# Patient Record
Sex: FEMALE | Race: NATIVE HAWAIIAN OR OTHER PACIFIC ISLANDER | NOT HISPANIC OR LATINO | Employment: FULL TIME | URBAN - METROPOLITAN AREA
[De-identification: names, ages, dates, MRNs, and addresses within clinical notes are randomized per-mention and may not be internally consistent; named-entity substitution may affect disease eponyms.]

---

## 2017-12-19 ENCOUNTER — NON-PROVIDER VISIT (OUTPATIENT)
Dept: OCCUPATIONAL MEDICINE | Facility: CLINIC | Age: 32
End: 2017-12-19

## 2017-12-19 DIAGNOSIS — Z11.1 ENCOUNTER FOR PPD TEST: ICD-10-CM

## 2017-12-19 PROCEDURE — 86580 TB INTRADERMAL TEST: CPT | Performed by: PREVENTIVE MEDICINE

## 2017-12-21 ENCOUNTER — NON-PROVIDER VISIT (OUTPATIENT)
Dept: OCCUPATIONAL MEDICINE | Facility: CLINIC | Age: 32
End: 2017-12-21

## 2017-12-21 DIAGNOSIS — Z11.1 ENCOUNTER FOR PPD TEST: ICD-10-CM

## 2017-12-21 LAB — TB WHEAL 3D P 5 TU DIAM: NORMAL MM

## 2018-03-29 ENCOUNTER — HOSPITAL ENCOUNTER (OUTPATIENT)
Facility: MEDICAL CENTER | Age: 33
End: 2018-03-29
Attending: PHYSICIAN ASSISTANT
Payer: COMMERCIAL

## 2018-03-29 ENCOUNTER — OFFICE VISIT (OUTPATIENT)
Dept: URGENT CARE | Facility: PHYSICIAN GROUP | Age: 33
End: 2018-03-29
Payer: COMMERCIAL

## 2018-03-29 VITALS
DIASTOLIC BLOOD PRESSURE: 70 MMHG | SYSTOLIC BLOOD PRESSURE: 110 MMHG | OXYGEN SATURATION: 93 % | TEMPERATURE: 98.5 F | BODY MASS INDEX: 38.82 KG/M2 | HEIGHT: 65 IN | HEART RATE: 103 BPM | WEIGHT: 233 LBS

## 2018-03-29 DIAGNOSIS — N30.01 ACUTE CYSTITIS WITH HEMATURIA: Primary | ICD-10-CM

## 2018-03-29 DIAGNOSIS — R30.0 DYSURIA: ICD-10-CM

## 2018-03-29 LAB
APPEARANCE UR: CLEAR
BILIRUB UR STRIP-MCNC: NORMAL MG/DL
COLOR UR AUTO: NORMAL
GLUCOSE UR STRIP.AUTO-MCNC: NORMAL MG/DL
INT CON NEG: NEGATIVE
INT CON POS: POSITIVE
KETONES UR STRIP.AUTO-MCNC: NORMAL MG/DL
LEUKOCYTE ESTERASE UR QL STRIP.AUTO: NORMAL
NITRITE UR QL STRIP.AUTO: NORMAL
PH UR STRIP.AUTO: 6 [PH] (ref 5–8)
POC URINE PREGNANCY TEST: NORMAL
PROT UR QL STRIP: NORMAL MG/DL
RBC UR QL AUTO: NORMAL
SP GR UR STRIP.AUTO: 1.01
UROBILINOGEN UR STRIP-MCNC: NORMAL MG/DL

## 2018-03-29 PROCEDURE — 81002 URINALYSIS NONAUTO W/O SCOPE: CPT | Performed by: PHYSICIAN ASSISTANT

## 2018-03-29 PROCEDURE — 81025 URINE PREGNANCY TEST: CPT | Performed by: PHYSICIAN ASSISTANT

## 2018-03-29 PROCEDURE — 87086 URINE CULTURE/COLONY COUNT: CPT

## 2018-03-29 PROCEDURE — 99214 OFFICE O/P EST MOD 30 MIN: CPT | Performed by: PHYSICIAN ASSISTANT

## 2018-03-29 RX ORDER — CEFDINIR 300 MG/1
300 CAPSULE ORAL EVERY 12 HOURS
Qty: 10 CAP | Refills: 0 | Status: SHIPPED | OUTPATIENT
Start: 2018-03-29 | End: 2018-04-03

## 2018-03-30 DIAGNOSIS — R30.0 DYSURIA: ICD-10-CM

## 2018-03-30 DIAGNOSIS — N30.01 ACUTE CYSTITIS WITH HEMATURIA: ICD-10-CM

## 2018-03-31 ASSESSMENT — ENCOUNTER SYMPTOMS
CHILLS: 0
FEVER: 0

## 2018-04-01 NOTE — PROGRESS NOTES
"Subjective:      Fatemeh De La O is a 33 y.o. female who presents with UTI (urgency, frequently, painful urination, x2 days)    PMH:  has a past medical history of Allergy; Asthma; and Chronic bronchitis (CMS-[facility]).  MEDS:   Current Outpatient Prescriptions:   •  cefdinir (OMNICEF) 300 MG Cap, Take 1 Cap by mouth every 12 hours for 5 days., Disp: 10 Cap, Rfl: 0  •  clomiPHENE (CLOMID) 50 MG tablet, Take 50 mg by mouth every day., Disp: , Rfl:   •  progesterone (PROMETRIUM) 100 MG Cap, Take 100 mg by mouth every day., Disp: , Rfl:   •  doxycycline (VIBRAMYCIN) 100 MG Tab, Take 1 Tab by mouth 2 times a day., Disp: 20 Tab, Rfl: 0  ALLERGIES: No Known Allergies  SURGHX:   Past Surgical History:   Procedure Laterality Date   • DENTAL EXTRACTION(S)       SOCHX:  reports that she has been smoking Cigarettes.  She has a 3.00 pack-year smoking history. She has never used smokeless tobacco. She reports that she drinks about 1.2 - 1.8 oz of alcohol per week . She reports that she does not use drugs.  FH: Reviewed with patient/family. Not pertinent to this complaint.            UTI   This is a new problem. The current episode started yesterday. The problem occurs constantly. The problem has been gradually worsening. Associated symptoms include urinary symptoms. Pertinent negatives include no chills or fever.       Review of Systems   Constitutional: Negative for chills and fever.   Genitourinary: Positive for dysuria and frequency.   All other systems reviewed and are negative.         Objective:     /70   Pulse (!) 103   Temp 36.9 °C (98.5 °F)   Ht 1.651 m (5' 5\")   Wt 105.7 kg (233 lb)   SpO2 93%   BMI 38.77 kg/m²      Physical Exam   Constitutional: She is oriented to person, place, and time. She appears well-developed and well-nourished. No distress.   HENT:   Head: Normocephalic and atraumatic.   Nose: Nose normal.   Mouth/Throat: Oropharynx is clear and moist.   Eyes: Conjunctivae and EOM are normal. Pupils " are equal, round, and reactive to light.   Neck: Normal range of motion. Neck supple.   Cardiovascular: Normal rate, regular rhythm and normal heart sounds.    Pulmonary/Chest: Effort normal and breath sounds normal.   Abdominal: Soft. Bowel sounds are normal. There is no rebound and no guarding.   Musculoskeletal: Normal range of motion.   Neurological: She is alert and oriented to person, place, and time. Gait normal.   Skin: Skin is warm and dry. Capillary refill takes less than 2 seconds.   Psychiatric: She has a normal mood and affect.   Nursing note and vitals reviewed.          UA results interpreted by me:  +hematuria, +LE  Preg: neg  Assessment/Plan:     1. Acute cystitis with hematuria  POCT Urinalysis    POCT PREGNANCY    Urine Culture    cefdinir (OMNICEF) 300 MG Cap   2. Dysuria  POCT Urinalysis    POCT PREGNANCY    Urine Culture    cefdinir (OMNICEF) 300 MG Cap     PT can continue OTC medications, increase fluids and rest until symptoms improve.     PT should follow up with PCP in 1-2 days for re-evaluation if symptoms have not improved.  Discussed red flags and reasons to return to UC or ED.  Pt and/or family verbalized understanding of diagnosis and follow up instructions and was offered informational handout on diagnosis.  PT discharged.

## 2018-04-02 LAB
BACTERIA UR CULT: NORMAL
SIGNIFICANT IND 70042: NORMAL
SITE SITE: NORMAL
SOURCE SOURCE: NORMAL

## 2018-05-10 ENCOUNTER — OFFICE VISIT (OUTPATIENT)
Dept: URGENT CARE | Facility: PHYSICIAN GROUP | Age: 33
End: 2018-05-10
Payer: COMMERCIAL

## 2018-05-10 VITALS
TEMPERATURE: 97.9 F | WEIGHT: 234.4 LBS | HEART RATE: 86 BPM | OXYGEN SATURATION: 95 % | DIASTOLIC BLOOD PRESSURE: 86 MMHG | BODY MASS INDEX: 39.05 KG/M2 | HEIGHT: 65 IN | SYSTOLIC BLOOD PRESSURE: 130 MMHG

## 2018-05-10 DIAGNOSIS — J30.1 SEASONAL ALLERGIC RHINITIS DUE TO POLLEN: ICD-10-CM

## 2018-05-10 DIAGNOSIS — F17.200 TOBACCO DEPENDENCE: ICD-10-CM

## 2018-05-10 DIAGNOSIS — J45.20 MILD INTERMITTENT ASTHMA WITHOUT COMPLICATION: ICD-10-CM

## 2018-05-10 DIAGNOSIS — H69.92 DYSFUNCTION OF LEFT EUSTACHIAN TUBE: ICD-10-CM

## 2018-05-10 DIAGNOSIS — J02.0 STREP PHARYNGITIS: ICD-10-CM

## 2018-05-10 PROCEDURE — 99215 OFFICE O/P EST HI 40 MIN: CPT | Performed by: FAMILY MEDICINE

## 2018-05-10 RX ORDER — AMOXICILLIN AND CLAVULANATE POTASSIUM 875; 125 MG/1; MG/1
1 TABLET, FILM COATED ORAL 2 TIMES DAILY
Qty: 20 TAB | Refills: 0 | Status: SHIPPED | OUTPATIENT
Start: 2018-05-10 | End: 2018-12-16

## 2018-05-10 ASSESSMENT — ENCOUNTER SYMPTOMS
EYE REDNESS: 0
WHEEZING: 0
MYALGIAS: 0
WEAKNESS: 0
VOMITING: 0
EYE PAIN: 0
WEIGHT LOSS: 0
DIZZINESS: 0
FEVER: 1
BRUISES/BLEEDS EASILY: 0
DIAPHORESIS: 0
NAUSEA: 0
COUGH: 1
SHORTNESS OF BREATH: 0
SORE THROAT: 1
INSOMNIA: 0
EYE DISCHARGE: 0
HEADACHES: 1
CHILLS: 1

## 2018-05-10 NOTE — PROGRESS NOTES
Subjective:      Fatemeh De La O is a 33 y.o. female who presents with Sore Throat (hard to Swallow, cough, runny nose, x5 days)    Of the patient presents to urgent care at 5 days of sore throat fevers chills mostly dry cough, runny nose and congestion as well. She has a history of seasonal allergies have been acting up suddenly also with asthma. She is a smoker who is trying greatly to quit smoking currently. No nausea vomiting or diarrhea. Her son has similar symptoms of sore throat her started the day after his. She took ibuprofen about an hour before arrival to urgent care    HPI  Review of Systems   Constitutional: Positive for chills, fever and malaise/fatigue. Negative for diaphoresis and weight loss.   HENT: Positive for sore throat. Negative for ear pain.    Eyes: Negative for pain, discharge and redness.   Respiratory: Positive for cough. Negative for shortness of breath and wheezing.    Cardiovascular: Negative for chest pain.   Gastrointestinal: Negative for nausea and vomiting.   Genitourinary: Negative for dysuria and urgency.   Musculoskeletal: Negative for myalgias.   Skin: Negative for itching and rash.   Neurological: Positive for headaches. Negative for dizziness and weakness.   Endo/Heme/Allergies: Does not bruise/bleed easily.   Psychiatric/Behavioral: The patient does not have insomnia.    All other systems reviewed and are negative.    PMH:  has a past medical history of Allergy; Asthma; and Chronic bronchitis (HCC).  MEDS:   Current Outpatient Prescriptions:   •  Pseudoephedrine-Ibuprofen (ADVIL COLD/SINUS PO), Take  by mouth., Disp: , Rfl:   •  Dextromethorphan Polistirex (ROBITUSSIN 12 HOUR COUGH PO), Take  by mouth., Disp: , Rfl:   •  amoxicillin-clavulanate (AUGMENTIN) 875-125 MG Tab, Take 1 Tab by mouth 2 times a day. With food, Disp: 20 Tab, Rfl: 0  •  clomiPHENE (CLOMID) 50 MG tablet, Take 50 mg by mouth every day., Disp: , Rfl:   •  progesterone (PROMETRIUM) 100 MG Cap, Take 100 mg  "by mouth every day., Disp: , Rfl:   •  doxycycline (VIBRAMYCIN) 100 MG Tab, Take 1 Tab by mouth 2 times a day., Disp: 20 Tab, Rfl: 0  ALLERGIES: No Known Allergies  SURGHX:   Past Surgical History:   Procedure Laterality Date   • DENTAL EXTRACTION(S)       SOCHX:  reports that she has been smoking Cigarettes.  She has a 3.00 pack-year smoking history. She has never used smokeless tobacco. She reports that she drinks about 1.2 - 1.8 oz of alcohol per week . She reports that she does not use drugs.  FH: Family history was reviewed, no pertinent findings to report   Objective:     /86   Pulse 86   Temp 36.6 °C (97.9 °F)   Ht 1.651 m (5' 5\")   Wt 106.3 kg (234 lb 6.4 oz)   SpO2 95%   BMI 39.01 kg/m²      Physical Exam   Constitutional: She is oriented to person, place, and time. She appears well-developed and well-nourished. No distress.   HENT:   Left Ear: Tympanic membrane is injected and erythematous.   Mouth/Throat: Mucous membranes are normal. Posterior oropharyngeal edema and posterior oropharyngeal erythema present. No oropharyngeal exudate.   Eyes: Conjunctivae and EOM are normal. Pupils are equal, round, and reactive to light.   Neck: Normal range of motion.   Cardiovascular: Normal rate, regular rhythm, normal heart sounds and intact distal pulses.  Exam reveals no gallop and no friction rub.    No murmur heard.  Pulmonary/Chest: Effort normal and breath sounds normal. No respiratory distress. She has no wheezes. She exhibits no tenderness.   Abdominal: Soft.   Musculoskeletal: Normal range of motion. She exhibits no edema.   Lymphadenopathy:     She has cervical adenopathy.   Neurological: She is alert and oriented to person, place, and time.   Skin: Skin is warm and dry. She is not diaphoretic.   Psychiatric: She has a normal mood and affect. Her behavior is normal. Judgment and thought content normal.            Assessment/Plan:     1. Strep pharyngitis  Pseudoephedrine-Ibuprofen (ADVIL " COLD/SINUS PO)    Dextromethorphan Polistirex (ROBITUSSIN 12 HOUR COUGH PO)    amoxicillin-clavulanate (AUGMENTIN) 875-125 MG Tab   2. Dysfunction of left eustachian tube  Pseudoephedrine-Ibuprofen (ADVIL COLD/SINUS PO)    Dextromethorphan Polistirex (ROBITUSSIN 12 HOUR COUGH PO)    amoxicillin-clavulanate (AUGMENTIN) 875-125 MG Tab   3. Seasonal allergic rhinitis due to pollen  Pseudoephedrine-Ibuprofen (ADVIL COLD/SINUS PO)    Dextromethorphan Polistirex (ROBITUSSIN 12 HOUR COUGH PO)    amoxicillin-clavulanate (AUGMENTIN) 875-125 MG Tab   4. Tobacco dependence  Pseudoephedrine-Ibuprofen (ADVIL COLD/SINUS PO)    Dextromethorphan Polistirex (ROBITUSSIN 12 HOUR COUGH PO)    amoxicillin-clavulanate (AUGMENTIN) 875-125 MG Tab   5. Mild intermittent asthma without complication       Differential diagnosis, natural history, supportive care discussed. Follow-up with primary care provider within 7-10 days, emergency room precautions discussed.  Patient and/or family appears understanding of information.    Chronic condition of asthma  that may potentially impact the patient's ability to recover from this infection appear stable. The patient will f/u with their pcp and continue with current chronic medications as directed.

## 2018-12-16 ENCOUNTER — OFFICE VISIT (OUTPATIENT)
Dept: URGENT CARE | Facility: PHYSICIAN GROUP | Age: 33
End: 2018-12-16
Payer: COMMERCIAL

## 2018-12-16 VITALS
BODY MASS INDEX: 39.32 KG/M2 | OXYGEN SATURATION: 98 % | DIASTOLIC BLOOD PRESSURE: 74 MMHG | TEMPERATURE: 97.6 F | RESPIRATION RATE: 16 BRPM | HEIGHT: 65 IN | WEIGHT: 236 LBS | HEART RATE: 102 BPM | SYSTOLIC BLOOD PRESSURE: 112 MMHG

## 2018-12-16 DIAGNOSIS — R05.9 COUGH: ICD-10-CM

## 2018-12-16 DIAGNOSIS — R09.82 POST-NASAL DRIP: ICD-10-CM

## 2018-12-16 DIAGNOSIS — Z86.19 HISTORY OF CANDIDIASIS: ICD-10-CM

## 2018-12-16 DIAGNOSIS — J01.10 ACUTE FRONTAL SINUSITIS, RECURRENCE NOT SPECIFIED: Primary | ICD-10-CM

## 2018-12-16 PROCEDURE — 99214 OFFICE O/P EST MOD 30 MIN: CPT | Performed by: PHYSICIAN ASSISTANT

## 2018-12-16 RX ORDER — FLUCONAZOLE 150 MG/1
150 TABLET ORAL DAILY
Qty: 2 TAB | Refills: 0 | Status: SHIPPED | OUTPATIENT
Start: 2018-12-16 | End: 2019-01-28

## 2018-12-16 RX ORDER — AMOXICILLIN AND CLAVULANATE POTASSIUM 875; 125 MG/1; MG/1
1 TABLET, FILM COATED ORAL 2 TIMES DAILY
Qty: 20 TAB | Refills: 0 | Status: SHIPPED | OUTPATIENT
Start: 2018-12-16 | End: 2019-01-28

## 2018-12-16 ASSESSMENT — ENCOUNTER SYMPTOMS
FEVER: 1
SHORTNESS OF BREATH: 0
WHEEZING: 0
HEADACHES: 1
SINUS PAIN: 1
RHINORRHEA: 1
COUGH: 1
SORE THROAT: 0

## 2018-12-16 NOTE — PROGRESS NOTES
Subjective:      Fatemeh De LaO is a 33 y.o. female who presents with Cough (Congestion, headache and fever x 1 week)    PMH:  has a past medical history of Allergy; Asthma; and Chronic bronchitis (HCC).  MEDS:   Current Outpatient Prescriptions:   •  Pseudoephedrine-Ibuprofen (ADVIL COLD/SINUS PO), Take  by mouth., Disp: , Rfl:   •  Dextromethorphan Polistirex (ROBITUSSIN 12 HOUR COUGH PO), Take  by mouth., Disp: , Rfl:   •  amoxicillin-clavulanate (AUGMENTIN) 875-125 MG Tab, Take 1 Tab by mouth 2 times a day. With food (Patient not taking: Reported on 12/16/2018), Disp: 20 Tab, Rfl: 0  •  clomiPHENE (CLOMID) 50 MG tablet, Take 50 mg by mouth every day., Disp: , Rfl:   •  progesterone (PROMETRIUM) 100 MG Cap, Take 100 mg by mouth every day., Disp: , Rfl:   •  doxycycline (VIBRAMYCIN) 100 MG Tab, Take 1 Tab by mouth 2 times a day. (Patient not taking: Reported on 12/16/2018), Disp: 20 Tab, Rfl: 0  ALLERGIES: No Known Allergies  SURGHX:   Past Surgical History:   Procedure Laterality Date   • DENTAL EXTRACTION(S)       SOCHX:  reports that she has been smoking Cigarettes.  She has a 3.00 pack-year smoking history. She has never used smokeless tobacco. She reports that she drinks about 1.2 - 1.8 oz of alcohol per week . She reports that she does not use drugs.  FH: Reviewed with patient, not pertinent to this visit.           Patient presents with:  Cough: Congestion, headache and fever x 1 week.  Patient states symptoms started as a typical cold runny nose scratchy throat little postnasal drip without a fever.  Patient states things are starting to improve day before yesterday, then she developed a fever forehead pain headache significant purulent colored discharge when she blows her nose.  Patient has taken multiple over-the-counter medications with some improvement but no resolution          Cough   This is a new problem. The current episode started in the past 7 days. The problem has been gradually worsening.  "The problem occurs every few minutes. The cough is productive of sputum. Associated symptoms include a fever, headaches, nasal congestion, postnasal drip and rhinorrhea. Pertinent negatives include no sore throat, shortness of breath or wheezing. The symptoms are aggravated by lying down. Risk factors for lung disease include occupational exposure. She has tried body position changes and OTC cough suppressant for the symptoms. The treatment provided mild relief. Her past medical history is significant for asthma, bronchitis and environmental allergies. There is no history of pneumonia.       Review of Systems   Constitutional: Positive for fever.   HENT: Positive for congestion, postnasal drip, rhinorrhea and sinus pain. Negative for sore throat.    Respiratory: Positive for cough. Negative for shortness of breath and wheezing.    Neurological: Positive for headaches.   Endo/Heme/Allergies: Positive for environmental allergies.   All other systems reviewed and are negative.         Objective:     /74 (BP Location: Left arm, Patient Position: Sitting, BP Cuff Size: Adult)   Pulse (!) 102   Temp 36.4 °C (97.6 °F) (Temporal)   Resp 16   Ht 1.651 m (5' 5\")   Wt 107 kg (236 lb)   LMP 10/21/2018 (Approximate)   SpO2 98%   BMI 39.27 kg/m²      Physical Exam   Constitutional: She is oriented to person, place, and time. She appears well-developed and well-nourished. No distress.   HENT:   Head: Normocephalic and atraumatic.   Right Ear: Tympanic membrane normal.   Left Ear: Tympanic membrane normal.   Nose: Mucosal edema and rhinorrhea present. Right sinus exhibits frontal sinus tenderness. Left sinus exhibits frontal sinus tenderness.   Mouth/Throat: Uvula is midline and mucous membranes are normal. Posterior oropharyngeal erythema present. No oropharyngeal exudate or posterior oropharyngeal edema.   Post nasal drip in posterior oropharynx   Eyes: Pupils are equal, round, and reactive to light. Conjunctivae " and EOM are normal.   Neck: Normal range of motion. Neck supple.   Cardiovascular: Normal rate and regular rhythm.    Pulmonary/Chest: Effort normal and breath sounds normal. No respiratory distress.   Abdominal: Soft.   Musculoskeletal: Normal range of motion.   Lymphadenopathy:     She has no cervical adenopathy.   Neurological: She is alert and oriented to person, place, and time.   Skin: Skin is warm and dry. Capillary refill takes less than 2 seconds.   Psychiatric: She has a normal mood and affect.   Nursing note and vitals reviewed.         Assessment/Plan:      1. Acute frontal sinusitis, recurrence not specified  amoxicillin-clavulanate (AUGMENTIN) 875-125 MG Tab    fluconazole (DIFLUCAN) 150 MG tablet   2. Post-nasal drip  amoxicillin-clavulanate (AUGMENTIN) 875-125 MG Tab    fluconazole (DIFLUCAN) 150 MG tablet   3. Cough  amoxicillin-clavulanate (AUGMENTIN) 875-125 MG Tab    fluconazole (DIFLUCAN) 150 MG tablet   4. History of candidiasis  amoxicillin-clavulanate (AUGMENTIN) 875-125 MG Tab    fluconazole (DIFLUCAN) 150 MG tablet     PT can continue OTC medications, increase fluids and rest until symptoms improve.     PT should follow up with PCP in 1-2 days for re-evaluation if symptoms have not improved.  Discussed red flags and reasons to return to UC or ED.  Pt and/or family verbalized understanding of diagnosis and follow up instructions and was offered informational handout on diagnosis.  PT discharged.

## 2019-01-23 ENCOUNTER — APPOINTMENT (OUTPATIENT)
Dept: RADIOLOGY | Facility: IMAGING CENTER | Age: 34
End: 2019-01-23
Attending: PHYSICIAN ASSISTANT
Payer: COMMERCIAL

## 2019-01-23 ENCOUNTER — OFFICE VISIT (OUTPATIENT)
Dept: URGENT CARE | Facility: PHYSICIAN GROUP | Age: 34
End: 2019-01-23
Payer: COMMERCIAL

## 2019-01-23 VITALS
SYSTOLIC BLOOD PRESSURE: 118 MMHG | WEIGHT: 234 LBS | OXYGEN SATURATION: 98 % | BODY MASS INDEX: 38.99 KG/M2 | RESPIRATION RATE: 16 BRPM | DIASTOLIC BLOOD PRESSURE: 70 MMHG | HEART RATE: 150 BPM | HEIGHT: 65 IN | TEMPERATURE: 101.2 F

## 2019-01-23 DIAGNOSIS — R05.9 COUGH: ICD-10-CM

## 2019-01-23 DIAGNOSIS — R50.9 FEVER, UNSPECIFIED FEVER CAUSE: ICD-10-CM

## 2019-01-23 LAB
FLUAV+FLUBV AG SPEC QL IA: NORMAL
HETEROPH AB SER QL LA: NORMAL
INT CON NEG: NEGATIVE
INT CON POS: POSITIVE
S PYO AG THROAT QL: NORMAL

## 2019-01-23 PROCEDURE — 87804 INFLUENZA ASSAY W/OPTIC: CPT | Performed by: PHYSICIAN ASSISTANT

## 2019-01-23 PROCEDURE — 71046 X-RAY EXAM CHEST 2 VIEWS: CPT | Mod: TC | Performed by: PHYSICIAN ASSISTANT

## 2019-01-23 PROCEDURE — 99214 OFFICE O/P EST MOD 30 MIN: CPT | Performed by: PHYSICIAN ASSISTANT

## 2019-01-23 PROCEDURE — 86308 HETEROPHILE ANTIBODY SCREEN: CPT | Performed by: PHYSICIAN ASSISTANT

## 2019-01-23 PROCEDURE — 87880 STREP A ASSAY W/OPTIC: CPT | Performed by: PHYSICIAN ASSISTANT

## 2019-01-23 RX ORDER — CODEINE PHOSPHATE/GUAIFENESIN 10-100MG/5
10 LIQUID (ML) ORAL 4 TIMES DAILY PRN
Qty: 200 ML | Refills: 0 | Status: SHIPPED | OUTPATIENT
Start: 2019-01-23 | End: 2019-01-28

## 2019-01-23 RX ORDER — OSELTAMIVIR PHOSPHATE 75 MG/1
75 CAPSULE ORAL 2 TIMES DAILY
Qty: 10 CAP | Refills: 0 | Status: SHIPPED | OUTPATIENT
Start: 2019-01-23 | End: 2019-01-28

## 2019-01-23 NOTE — LETTER
January 23, 2019         Patient: Fatemeh De La O   YOB: 1985   Date of Visit: 1/23/2019           To Whom it May Concern:    Fatemeh De La O was seen in my clinic on 1/23/2019. She may return to work on 01/28/2019.     If you have any questions or concerns, please don't hesitate to call.        Sincerely,           Lizzy Nava P.A.-C.  Electronically Signed

## 2019-01-24 ENCOUNTER — HOSPITAL ENCOUNTER (OUTPATIENT)
Dept: LAB | Facility: MEDICAL CENTER | Age: 34
End: 2019-01-24
Attending: PHYSICIAN ASSISTANT
Payer: COMMERCIAL

## 2019-01-24 DIAGNOSIS — R05.9 COUGH: ICD-10-CM

## 2019-01-24 DIAGNOSIS — R50.9 FEVER, UNSPECIFIED FEVER CAUSE: ICD-10-CM

## 2019-01-24 LAB
ALBUMIN SERPL BCP-MCNC: 4.2 G/DL (ref 3.2–4.9)
ALBUMIN/GLOB SERPL: 1.3 G/DL
ALP SERPL-CCNC: 42 U/L (ref 30–99)
ALT SERPL-CCNC: 15 U/L (ref 2–50)
ANION GAP SERPL CALC-SCNC: 7 MMOL/L (ref 0–11.9)
AST SERPL-CCNC: 13 U/L (ref 12–45)
BASOPHILS # BLD AUTO: 0.2 % (ref 0–1.8)
BASOPHILS # BLD: 0.04 K/UL (ref 0–0.12)
BILIRUB SERPL-MCNC: 0.4 MG/DL (ref 0.1–1.5)
BUN SERPL-MCNC: 11 MG/DL (ref 8–22)
CALCIUM SERPL-MCNC: 9.2 MG/DL (ref 8.5–10.5)
CHLORIDE SERPL-SCNC: 105 MMOL/L (ref 96–112)
CO2 SERPL-SCNC: 24 MMOL/L (ref 20–33)
CREAT SERPL-MCNC: 0.81 MG/DL (ref 0.5–1.4)
EOSINOPHIL # BLD AUTO: 0 K/UL (ref 0–0.51)
EOSINOPHIL NFR BLD: 0 % (ref 0–6.9)
ERYTHROCYTE [DISTWIDTH] IN BLOOD BY AUTOMATED COUNT: 42.1 FL (ref 35.9–50)
GLOBULIN SER CALC-MCNC: 3.3 G/DL (ref 1.9–3.5)
GLUCOSE SERPL-MCNC: 97 MG/DL (ref 65–99)
HCT VFR BLD AUTO: 42.8 % (ref 37–47)
HGB BLD-MCNC: 14.6 G/DL (ref 12–16)
IMM GRANULOCYTES # BLD AUTO: 0.12 K/UL (ref 0–0.11)
IMM GRANULOCYTES NFR BLD AUTO: 0.6 % (ref 0–0.9)
LYMPHOCYTES # BLD AUTO: 2.34 K/UL (ref 1–4.8)
LYMPHOCYTES NFR BLD: 11.3 % (ref 22–41)
MCH RBC QN AUTO: 30.4 PG (ref 27–33)
MCHC RBC AUTO-ENTMCNC: 34.1 G/DL (ref 33.6–35)
MCV RBC AUTO: 89 FL (ref 81.4–97.8)
MONOCYTES # BLD AUTO: 1.03 K/UL (ref 0–0.85)
MONOCYTES NFR BLD AUTO: 5 % (ref 0–13.4)
NEUTROPHILS # BLD AUTO: 17.26 K/UL (ref 2–7.15)
NEUTROPHILS NFR BLD: 82.9 % (ref 44–72)
NRBC # BLD AUTO: 0 K/UL
NRBC BLD-RTO: 0 /100 WBC
PLATELET # BLD AUTO: 346 K/UL (ref 164–446)
PMV BLD AUTO: 9.1 FL (ref 9–12.9)
POTASSIUM SERPL-SCNC: 4.1 MMOL/L (ref 3.6–5.5)
PROT SERPL-MCNC: 7.5 G/DL (ref 6–8.2)
RBC # BLD AUTO: 4.81 M/UL (ref 4.2–5.4)
SODIUM SERPL-SCNC: 136 MMOL/L (ref 135–145)
WBC # BLD AUTO: 20.8 K/UL (ref 4.8–10.8)

## 2019-01-24 PROCEDURE — 85025 COMPLETE CBC W/AUTO DIFF WBC: CPT

## 2019-01-24 PROCEDURE — 80053 COMPREHEN METABOLIC PANEL: CPT

## 2019-01-24 PROCEDURE — 36415 COLL VENOUS BLD VENIPUNCTURE: CPT

## 2019-01-24 NOTE — PROGRESS NOTES
"Subjective:      Fatemeh De La O is a 33 y.o. female who presents with Body Aches (chills x yesterday )    PMH:  has a past medical history of Allergy; Asthma; and Chronic bronchitis (HCC).  MEDS:   Current Outpatient Prescriptions:   •  amoxicillin-clavulanate (AUGMENTIN) 875-125 MG Tab, Take 1 Tab by mouth 2 times a day., Disp: 20 Tab, Rfl: 0  •  fluconazole (DIFLUCAN) 150 MG tablet, Take 1 Tab by mouth every day., Disp: 2 Tab, Rfl: 0  •  Pseudoephedrine-Ibuprofen (ADVIL COLD/SINUS PO), Take  by mouth., Disp: , Rfl:   •  Dextromethorphan Polistirex (ROBITUSSIN 12 HOUR COUGH PO), Take  by mouth., Disp: , Rfl:   •  clomiPHENE (CLOMID) 50 MG tablet, Take 50 mg by mouth every day., Disp: , Rfl:   •  progesterone (PROMETRIUM) 100 MG Cap, Take 100 mg by mouth every day., Disp: , Rfl:   ALLERGIES: No Known Allergies  SURGHX:   Past Surgical History:   Procedure Laterality Date   • DENTAL EXTRACTION(S)       SOCHX:  reports that she has been smoking Cigarettes.  She has a 3.00 pack-year smoking history. She has never used smokeless tobacco. She reports that she drinks about 1.2 - 1.8 oz of alcohol per week . She reports that she does not use drugs.  FH: Reviewed with patient, not pertinent to this visit.           Patient presents with:  Body Aches: chills x yesterday, cough, congestion, sore throat, fatigue, \"exhausted\".  Pt denies urinary symptoms, abdominal pain, n/v/d.  PT states she thinks she has the flu despite a flu vaccine.    PT states she has had \"some kind of congestion, cough, sinusitis, strep throat, fever for months.  I have been treated with antibiotics and it improves but never quite goes away .\"      PT was treated for sinusitis 5 weeks ago with improvement but no resolution.   PT has tried otc ibuprofen and tylenol without relief.      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  " "        Influenza   This is a new problem. The current episode started yesterday. The problem occurs constantly. The problem has been rapidly worsening. Associated symptoms include congestion, coughing, a fever, headaches, myalgias, a sore throat and weakness. Pertinent negatives include no abdominal pain, chest pain, diaphoresis, nausea or vomiting. The symptoms are aggravated by coughing and exertion. She has tried acetaminophen, NSAIDs and rest for the symptoms. The treatment provided no relief.       Review of Systems   Constitutional: Positive for fever and malaise/fatigue. Negative for diaphoresis.   HENT: Positive for congestion, sinus pain and sore throat.    Respiratory: Positive for cough and sputum production. Negative for hemoptysis, shortness of breath, wheezing and stridor.    Cardiovascular: Negative for chest pain and palpitations.   Gastrointestinal: Negative for abdominal pain, diarrhea, nausea and vomiting.   Genitourinary: Negative for dysuria, flank pain, frequency and urgency.   Musculoskeletal: Positive for joint pain and myalgias.   Neurological: Positive for weakness and headaches.   All other systems reviewed and are negative.         Objective:     /70   Pulse (!) 150   Temp (!) 38.4 °C (101.2 °F)   Resp 16   Ht 1.651 m (5' 5\")   Wt 106.1 kg (234 lb)   SpO2 98%   BMI 38.94 kg/m²      Physical Exam   Constitutional: She is oriented to person, place, and time. She appears well-developed and well-nourished. She is cooperative.  Non-toxic appearance. She appears ill. No distress.   HENT:   Head: Normocephalic and atraumatic.   Right Ear: Tympanic membrane normal.   Left Ear: Tympanic membrane normal.   Nose: Rhinorrhea present.   Mouth/Throat: Uvula is midline and mucous membranes are normal. Posterior oropharyngeal erythema present.   Eyes: Pupils are equal, round, and reactive to light. Conjunctivae and EOM are normal.   Neck: Normal range of motion. Neck supple. "   Cardiovascular: Normal rate, regular rhythm and normal heart sounds.    Pulmonary/Chest: Effort normal. No respiratory distress. She has no decreased breath sounds. She has no wheezes. She has rhonchi. She has no rales.   Abdominal: Soft.   Musculoskeletal: Normal range of motion.   Neurological: She is alert and oriented to person, place, and time.   Skin: Skin is warm and dry. Capillary refill takes less than 2 seconds.   Psychiatric: She has a normal mood and affect.   Nursing note and vitals reviewed.         Strep: neg  Flu: neg  Mono: neg  Xray images viewed and interpreted by me, confirmed by radiology:        FINDINGS:  The heart is normal in size.  No pulmonary infiltrates or consolidations are noted.  No pleural effusions are appreciated.     Impression       Negative two views of the chest.   Reading Provider Reading Date   Mainor Badillo M.D. Jan 23, 2019   Signing Provider Signing Date Signing Time   Mainor Badillo M.D. Jan 23, 2019  5:28 PM       Assessment/Plan:     1. Fever, unspecified fever cause  POCT Rapid Strep A    POCT Influenza A/B    POCT Mononucleosis (mono)    DX-CHEST-2 VIEWS    CBC WITH DIFFERENTIAL    COMP METABOLIC PANEL    guaifenesin-codeine (TUSSI-ORGANIDIN NR) 100-10 MG/5ML syrup    oseltamivir (TAMIFLU) 75 MG Cap   2. Cough  POCT Rapid Strep A    POCT Influenza A/B    POCT Mononucleosis (mono)    DX-CHEST-2 VIEWS    CBC WITH DIFFERENTIAL    COMP METABOLIC PANEL    guaifenesin-codeine (TUSSI-ORGANIDIN NR) 100-10 MG/5ML syrup    oseltamivir (TAMIFLU) 75 MG Cap   Discussed POCT tests, CXR results with patient.    PT would like Flu treatment despite negative flu test. Rx sent.   PT labs pending.    PT given strict ER precautions.      PT should follow up with PCP in 1-2 days for re-evaluation if symptoms have not improved.  Discussed red flags and reasons to return to UC or ED.  Pt and/or family verbalized understanding of diagnosis and follow up instructions and was offered  informational handout on diagnosis.  PT discharged.

## 2019-01-25 ENCOUNTER — TELEPHONE (OUTPATIENT)
Dept: URGENT CARE | Facility: PHYSICIAN GROUP | Age: 34
End: 2019-01-25

## 2019-01-25 ASSESSMENT — ENCOUNTER SYMPTOMS
WEAKNESS: 1
DIAPHORESIS: 0
COUGH: 1
SINUS PAIN: 1
STRIDOR: 0
NAUSEA: 0
HEMOPTYSIS: 0
SORE THROAT: 1
MYALGIAS: 1
DIARRHEA: 0
WHEEZING: 0
VOMITING: 0
ABDOMINAL PAIN: 0
SPUTUM PRODUCTION: 1
SHORTNESS OF BREATH: 0
FLANK PAIN: 0
PALPITATIONS: 0
HEADACHES: 1
FEVER: 1

## 2019-01-25 NOTE — TELEPHONE ENCOUNTER
I spoke with patient regarding her lab results.  Pt WBC is greater than 20,000.  Pt does not have PCP at this time , is not feeling better today, so I instructed her to go to the ER for evaluation as this is very high and may indicate significant infection which we were unable to identify in the UC setting.  PT verbalized understanding and will go there today.

## 2019-01-28 ENCOUNTER — APPOINTMENT (OUTPATIENT)
Dept: RADIOLOGY | Facility: MEDICAL CENTER | Age: 34
End: 2019-01-28
Attending: EMERGENCY MEDICINE
Payer: COMMERCIAL

## 2019-01-28 ENCOUNTER — HOSPITAL ENCOUNTER (EMERGENCY)
Facility: MEDICAL CENTER | Age: 34
End: 2019-01-28
Attending: EMERGENCY MEDICINE
Payer: COMMERCIAL

## 2019-01-28 VITALS
HEIGHT: 65 IN | TEMPERATURE: 97.6 F | SYSTOLIC BLOOD PRESSURE: 116 MMHG | DIASTOLIC BLOOD PRESSURE: 75 MMHG | BODY MASS INDEX: 38.93 KG/M2 | HEART RATE: 93 BPM | RESPIRATION RATE: 14 BRPM | OXYGEN SATURATION: 97 % | WEIGHT: 233.69 LBS

## 2019-01-28 DIAGNOSIS — J01.01 ACUTE RECURRENT MAXILLARY SINUSITIS: ICD-10-CM

## 2019-01-28 LAB
ALBUMIN SERPL BCP-MCNC: 3.4 G/DL (ref 3.2–4.9)
ALBUMIN/GLOB SERPL: 1.1 G/DL
ALP SERPL-CCNC: 42 U/L (ref 30–99)
ALT SERPL-CCNC: 24 U/L (ref 2–50)
ANION GAP SERPL CALC-SCNC: 5 MMOL/L (ref 0–11.9)
AST SERPL-CCNC: 18 U/L (ref 12–45)
BASOPHILS # BLD AUTO: 0 % (ref 0–1.8)
BASOPHILS # BLD: 0 K/UL (ref 0–0.12)
BILIRUB SERPL-MCNC: 0.4 MG/DL (ref 0.1–1.5)
BUN SERPL-MCNC: 13 MG/DL (ref 8–22)
CALCIUM SERPL-MCNC: 8.7 MG/DL (ref 8.4–10.2)
CHLORIDE SERPL-SCNC: 106 MMOL/L (ref 96–112)
CO2 SERPL-SCNC: 24 MMOL/L (ref 20–33)
CREAT SERPL-MCNC: 0.77 MG/DL (ref 0.5–1.4)
EOSINOPHIL # BLD AUTO: 0.27 K/UL (ref 0–0.51)
EOSINOPHIL NFR BLD: 3 % (ref 0–6.9)
ERYTHROCYTE [DISTWIDTH] IN BLOOD BY AUTOMATED COUNT: 40.6 FL (ref 35.9–50)
GLOBULIN SER CALC-MCNC: 3.1 G/DL (ref 1.9–3.5)
GLUCOSE SERPL-MCNC: 101 MG/DL (ref 65–99)
HCG SERPL QL: NEGATIVE
HCT VFR BLD AUTO: 40.3 % (ref 37–47)
HGB BLD-MCNC: 13.7 G/DL (ref 12–16)
LACTATE BLD-SCNC: 0.6 MMOL/L (ref 0.5–2)
LYMPHOCYTES # BLD AUTO: 3.73 K/UL (ref 1–4.8)
LYMPHOCYTES NFR BLD: 41 % (ref 22–41)
MANUAL DIFF BLD: NORMAL
MCH RBC QN AUTO: 30.1 PG (ref 27–33)
MCHC RBC AUTO-ENTMCNC: 34 G/DL (ref 33.6–35)
MCV RBC AUTO: 88.6 FL (ref 81.4–97.8)
MONOCYTES # BLD AUTO: 0.82 K/UL (ref 0–0.85)
MONOCYTES NFR BLD AUTO: 9 % (ref 0–13.4)
NEUTROPHILS # BLD AUTO: 4.28 K/UL (ref 2–7.15)
NEUTROPHILS NFR BLD: 46 % (ref 44–72)
NEUTS BAND NFR BLD MANUAL: 1 % (ref 0–10)
NRBC # BLD AUTO: 0 K/UL
NRBC BLD-RTO: 0 /100 WBC
PLATELET # BLD AUTO: 367 K/UL (ref 164–446)
PLATELET BLD QL SMEAR: NORMAL
PMV BLD AUTO: 8.6 FL (ref 9–12.9)
POTASSIUM SERPL-SCNC: 4 MMOL/L (ref 3.6–5.5)
PROT SERPL-MCNC: 6.5 G/DL (ref 6–8.2)
RBC # BLD AUTO: 4.55 M/UL (ref 4.2–5.4)
RBC BLD AUTO: NORMAL
SODIUM SERPL-SCNC: 135 MMOL/L (ref 135–145)
VARIANT LYMPHS BLD QL SMEAR: NORMAL
WBC # BLD AUTO: 9.1 K/UL (ref 4.8–10.8)

## 2019-01-28 PROCEDURE — 85007 BL SMEAR W/DIFF WBC COUNT: CPT

## 2019-01-28 PROCEDURE — 84703 CHORIONIC GONADOTROPIN ASSAY: CPT

## 2019-01-28 PROCEDURE — 83605 ASSAY OF LACTIC ACID: CPT

## 2019-01-28 PROCEDURE — 70486 CT MAXILLOFACIAL W/O DYE: CPT

## 2019-01-28 PROCEDURE — 36415 COLL VENOUS BLD VENIPUNCTURE: CPT

## 2019-01-28 PROCEDURE — 99284 EMERGENCY DEPT VISIT MOD MDM: CPT

## 2019-01-28 PROCEDURE — 85027 COMPLETE CBC AUTOMATED: CPT

## 2019-01-28 PROCEDURE — 80053 COMPREHEN METABOLIC PANEL: CPT

## 2019-01-28 PROCEDURE — 71045 X-RAY EXAM CHEST 1 VIEW: CPT

## 2019-01-28 RX ORDER — IBUPROFEN 200 MG
400 TABLET ORAL EVERY 6 HOURS PRN
Status: SHIPPED | COMMUNITY
End: 2021-04-02

## 2019-01-28 RX ORDER — AMOXICILLIN AND CLAVULANATE POTASSIUM 875; 125 MG/1; MG/1
1 TABLET, FILM COATED ORAL 2 TIMES DAILY
Qty: 20 TAB | Refills: 0 | Status: SHIPPED | OUTPATIENT
Start: 2019-01-28 | End: 2019-02-07

## 2019-01-28 RX ORDER — FLUCONAZOLE 150 MG/1
150 TABLET ORAL DAILY
Qty: 2 TAB | Refills: 0 | Status: SHIPPED | OUTPATIENT
Start: 2019-01-28 | End: 2019-02-04

## 2019-01-28 RX ORDER — OSELTAMIVIR PHOSPHATE 75 MG/1
75 CAPSULE ORAL 2 TIMES DAILY
Status: SHIPPED | COMMUNITY
Start: 2019-01-24 | End: 2019-02-04

## 2019-01-28 NOTE — ED NOTES
Released with all follow-up and 2 RX, advised to see PCP ENT in follow-up. Verbalized understanding instructions.

## 2019-01-28 NOTE — ED PROVIDER NOTES
ED Provider Note    CHIEF COMPLAINT  Chief Complaint   Patient presents with   • Sinus Pain     started Saturday   • Sent from Urgent Care     labs drawn last week, received call to return to ED for elevated SBC   • Abdominal Pain     started last week       HPI  Fatemeh De La O is a 33 y.o. female who presents for evaluation of elevated white blood cell count and upper respiratory symptoms.  Patient was seen at the urgent care 4 days ago for upper respiratory symptoms.  The patient had a rapid strep, influenza, Monospot all of which were negative.  She had a CBC which showed white count of 20,000.  Patient was treated for potential influenza but was not started on antibiotics.  Patient states that since that time she is developed nasal congestion with pressure in the sinus regions along with yellowish rhinorrhea.  She also states her cough is worsened with increased sputum which is mostly whitish in nature.  She thinks she may have had a low-grade fever.  There is been no associated: Vomiting, diarrhea, genitourinary symptoms, rashes.  No other acute symptomatology or complaints.    REVIEW OF SYSTEMS  See HPI for further details.  No major health problems such as: Hypertension, diabetes, cardiac disorders, gastrointestinal disorders.  Does relate a history of bronchitis in the past as well as asthma and sinus infections.  All other systems negative.    PAST MEDICAL HISTORY  Past Medical History:   Diagnosis Date   • Allergy     seasonal   • Asthma    • Chronic bronchitis (HCC)        FAMILY HISTORY  Family History   Problem Relation Age of Onset   • Diabetes Father    • Hypertension Maternal Grandmother    • Hyperlipidemia Maternal Grandmother    • Lung Disease Maternal Grandfather         asthma   • Diabetes Maternal Grandfather    • Cancer Neg Hx    • Heart Disease Neg Hx    • Stroke Neg Hx    • Alcohol/Drug Neg Hx    • Thyroid Neg Hx        SOCIAL HISTORY  Positive tobacco use; positive alcohol  "use;    SURGICAL HISTORY  Past Surgical History:   Procedure Laterality Date   • DENTAL EXTRACTION(S)         CURRENT MEDICATIONS  See nurse's notes    ALLERGIES  No Known Allergies    PHYSICAL EXAM  VITAL SIGNS: /75   Pulse 93   Temp 36.2 °C (97.2 °F) (Temporal)   Resp 14   Ht 1.651 m (5' 5\")   Wt 106 kg (233 lb 11 oz)   LMP 11/28/2017   SpO2 97%   BMI 38.89 kg/m²    Constitutional: 33-year-old female, obese, well developed, Well nourished, No acute distress, Non-toxic appearance.   HENT: Normocephalic, Atraumatic, Nares: Congested but no purulent drainage, Oropharynx: moist, well hydrated, posterior pharynx:clear; the frontal sinuses bilaterally  Eyes: PERRL, EOMI, Conjunctiva normal, No discharge.   Neck: Normal range of motion, No tenderness, Supple, No stridor.   Lymphatic: No lymphadenopathy noted.   Cardiovascular: Regular rate and rhythm without mumurs, gallups, rubs   Thorax & Lungs: Mild bilateral rhonchi, No respiratory distress, No wheezing, no stridor, no rales. No chest tenderness.   Abdomen: Soft, nontender, nondistended, no organomegaly, positive bowel sounds normal in quality. No guarding or rebound.  Skin: Good skin turgor, pink, warm, dry. No rashes, petechiae, purpura. Normal capillary refill.   Back: No tenderness, No CVA tenderness.   Extremities: Intact distal pulses, No edema, No tenderness, No cyanosis,  Vascular: Pulses are 2+, symmetric in the upper and lower extremities.  Musculoskeletal: Good range of motion in all major joints. No tenderness to palpation or major deformities noted.   Neurologic: Alert & oriented x 3,  No gross focal deficits noted.   Psychiatric: Affect normal, Judgment normal, Mood normal.       RADIOLOGY/PROCEDURES  CT-MAXILLOFACIAL W/O PLUS RECONS   Final Result      Paranasal sinus disease as above described with a large mucous retention cyst in the right maxillary sinus.      Mucosal thickening along the nasal turbinates, right greater than left.    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      Maxillary ostia are obstructed bilaterally.      Prominent degenerative changes of the temporomandibular joints bilaterally.         DX-CHEST-PORTABLE (1 VIEW)   Final Result      No acute cardiopulmonary findings.            COURSE & MEDICAL DECISION MAKING  Pertinent Labs & Imaging studies reviewed. (See chart for details)  1.  Saline lock    Laboratory studies: CBC and differential within normal except for 1% band; CMP within normal; beta-hCG is negative; lactic acid 0.6; CMP within normal;    Discussion: At this time, the patient presents with persistent upper respiratory symptoms.  CT scan shows findings suggesting maxillary sinusitis.  Patient's white count has normalized at this point.  She does not appear toxic or septic.  Based on history of recurrent sinusitis the patient will be placed on antibiotic therapy.  Patient had previous strep, Monospot, and influenza all of which were negative.  I discussed the findings and treatment plan with the patient and parents were who are at the bedside.  They indicate that they are comfortable with this explanation disposition.    FINAL IMPRESSION  1. Acute recurrent maxillary sinusitis        PLAN  1.  Appropriate discharge instructions given  2.  Augmentin 875 mg #20  3.  Sudafed and Afrin nasal spray  4.  Follow-up with ENT  5.  Follow-up with primary care    Electronically signed by: Guy G Gansert, 1/28/2019 10:53 AM

## 2019-01-28 NOTE — ED TRIAGE NOTES
"Chief Complaint   Patient presents with   • Sinus Pain     started Saturday   • Sent from Urgent Care     labs drawn last week, received call to return to ED for elevated SBC   • Abdominal Pain     started last week     /75   Pulse 93   Temp 36.2 °C (97.2 °F) (Temporal)   Resp 14   Ht 1.651 m (5' 5\")   Wt 106 kg (233 lb 11 oz)   LMP 11/28/2017   SpO2 97%   BMI 38.89 kg/m²     Pt presents w/ mult complaints, including went to UC last week for fever, had lab tests and tests for Mono, Flu and Strep at that time.  Received a call WBC >20,000 and to return to ED.  "

## 2019-01-28 NOTE — ED NOTES
Med rec updated and complete  Allergies reviewed  Pt reports no vitamins.  Pt reports no antibiotics in the last 30 days.  Pt reports that she finished her course of AUGMENTIN 875-125MG on 1/26/2018.

## 2019-02-04 ENCOUNTER — OFFICE VISIT (OUTPATIENT)
Dept: MEDICAL GROUP | Facility: PHYSICIAN GROUP | Age: 34
End: 2019-02-04
Payer: COMMERCIAL

## 2019-02-04 VITALS
SYSTOLIC BLOOD PRESSURE: 100 MMHG | HEIGHT: 65 IN | WEIGHT: 230 LBS | TEMPERATURE: 98 F | BODY MASS INDEX: 38.32 KG/M2 | DIASTOLIC BLOOD PRESSURE: 76 MMHG

## 2019-02-04 DIAGNOSIS — J30.2 SEASONAL ALLERGIES: ICD-10-CM

## 2019-02-04 DIAGNOSIS — F17.211 CIGARETTE NICOTINE DEPENDENCE IN REMISSION: ICD-10-CM

## 2019-02-04 DIAGNOSIS — E28.2 PCOS (POLYCYSTIC OVARIAN SYNDROME): ICD-10-CM

## 2019-02-04 DIAGNOSIS — L21.9 SEBORRHEIC DERMATITIS: ICD-10-CM

## 2019-02-04 DIAGNOSIS — J01.91 ACUTE RECURRENT SINUSITIS, UNSPECIFIED LOCATION: ICD-10-CM

## 2019-02-04 PROCEDURE — 99214 OFFICE O/P EST MOD 30 MIN: CPT | Performed by: PHYSICIAN ASSISTANT

## 2019-02-04 ASSESSMENT — PATIENT HEALTH QUESTIONNAIRE - PHQ9: CLINICAL INTERPRETATION OF PHQ2 SCORE: 0

## 2019-02-04 NOTE — PROGRESS NOTES
Subjective:   Fatemeh De La O is a 33 y.o. female here today for urgent care/ER follow up for sinus infection. Is a new patient to me and is also establishing care today.    Previous PCP: PRITI Gregg    HPI:    Patient presents to the office today to establish care with me.    She was seen in  on 1/23/19 for recurrent fever and flu-like symptoms. She was treated with Tamiflu despite negative influenza test. She did not improve and was then seen in the ED on 1/28. She underwent CT and was found to have sinusitis. Was discharged home on Augmentin (which she is still taking) and referred to ENT given recurrent infections. Is overall feeling better. Fever is gone but still feeling fatigued, achy, with some night sweats.    Had annual woman's wellness exam last week. Provera and Clomid were prescribed as she is actively trying to get pregnant. Has struggled with infertility due to PCOS which was diagnosed in ~2013. Has very irregular periods--typically only once every 4 months. Has noticed more frequent menstruation when she eats healthy and exercises which she is trying to do more of. Quit smoking a little over a week ago which was easier for her than normal given that she's been sick. Only other chronic condition is seasonal allergies which flare up in the Spring and Fall months. Achieves symptom control with OTC loratadine.    Patient has new complaint today for dry, itchy skin of the back of her scalp and neck. Sometimes she scratches so hard the areas will bleed. Has been using Gold Bond lotion which helps somewhat.      Current medicines (including changes today)  Current Outpatient Prescriptions   Medication Sig Dispense Refill   • ibuprofen (MOTRIN) 200 MG Tab Take 400 mg by mouth every 6 hours as needed for Mild Pain.     • amoxicillin-clavulanate (AUGMENTIN) 875-125 MG Tab Take 1 Tab by mouth 2 times a day for 10 days. 20 Tab 0     No current facility-administered medications for this visit.   "    She  has a past medical history of Allergy; Asthma; and Chronic bronchitis (HCC).    ROS  No fever  +fatigue  +sinus pressure  +body aches  +night sweats       Objective:     Blood pressure 100/76, temperature 36.7 °C (98 °F), height 1.651 m (5' 5\"), weight 104.3 kg (230 lb), not currently breastfeeding. Body mass index is 38.27 kg/m².     Physical Exam:  Constitutional: Alert, obese but otherwise well-appearing, no distress.  Skin: Warm, dry, good turgor. Visible acanthosis nigricans in neck folds. There is mild erythema noted of the posterior scalp near the neck. No flaking seen.  Eye: Pupils are equal and round, conjunctiva clear, lids normal.  ENMT: Lips without lesions, moist mucus membranes.  Neck: No masses. No submandibular or cervical lymphadenopathy. No palpable thyromegaly.  Respiratory: Unlabored respiratory effort, lungs clear to auscultation, no wheezes, no rhonchi.  Cardiovascular: Normal S1, S2, no murmur.      Assessment and Plan:   The following treatment plan was discussed    1. Acute recurrent sinusitis, unspecified location  New problem to me, improving with Augmentin which she was advised to continue. Follow up with ENT--has referral already.    2. Seborrheic dermatitis  New problem, uncontrolled. Exam c/w mild seborrheic dermatitis. Recommend use of OTC dandruff shampoo. Can use OTC hydrocortisone cream as needed on itchy areas.    3. PCOS (polycystic ovarian syndrome)  Established problem, uncontrolled. Actively following with gynecology for infertility issues. Will continue to see Dr. Riley Padron. At risk for metabolic syndrome. Will obtain screening lab work.  - Lipid Profile; Future  - HEMOGLOBIN A1C; Future    4. Seasonal allergies  Chronic issue, well-controlled with PRN loratadine. Continue current management.    5. Cigarette nicotine dependence in remission  Chronic issue, improving, recently quit. Patient advised to let me know if she has interest in cessation medication.    6. " BMI 38.0-38.9,adult  Chronic issue, uncontrolled. Patient trying to work on lifestyle improvements as she realizes healthy diet/exercise will also improve her fertility.      Followup: Return in about 6 months (around 8/4/2019) for Annual; Short.    Leny Garvin P.A.-C.

## 2019-02-15 ENCOUNTER — HOSPITAL ENCOUNTER (OUTPATIENT)
Dept: LAB | Facility: MEDICAL CENTER | Age: 34
End: 2019-02-15
Attending: PHYSICIAN ASSISTANT
Payer: COMMERCIAL

## 2019-02-15 ENCOUNTER — TELEPHONE (OUTPATIENT)
Dept: MEDICAL GROUP | Facility: PHYSICIAN GROUP | Age: 34
End: 2019-02-15

## 2019-02-15 DIAGNOSIS — E28.2 PCOS (POLYCYSTIC OVARIAN SYNDROME): ICD-10-CM

## 2019-02-15 PROBLEM — E78.6 LOW HDL (UNDER 40): Status: ACTIVE | Noted: 2019-02-15

## 2019-02-15 PROBLEM — R73.03 PREDIABETES: Status: ACTIVE | Noted: 2019-02-15

## 2019-02-15 LAB
CHOLEST SERPL-MCNC: 131 MG/DL (ref 100–199)
EST. AVERAGE GLUCOSE BLD GHB EST-MCNC: 128 MG/DL
HBA1C MFR BLD: 6.1 % (ref 0–5.6)
HDLC SERPL-MCNC: 29 MG/DL
LDLC SERPL CALC-MCNC: 80 MG/DL
TRIGL SERPL-MCNC: 110 MG/DL (ref 0–149)

## 2019-02-15 PROCEDURE — 80061 LIPID PANEL: CPT

## 2019-02-15 PROCEDURE — 83036 HEMOGLOBIN GLYCOSYLATED A1C: CPT

## 2019-02-15 PROCEDURE — 36415 COLL VENOUS BLD VENIPUNCTURE: CPT

## 2019-02-15 NOTE — LETTER
February 19, 2019        Fatemeh De La O  [address] [address]        Dear Fatemeh:    Your labs were significant for elevated hemoglobin A1c.  It puts you in the prediabetes range.  This actually is often seen in people with polycystic ovarian syndrome.  It may be worth it to either discuss with Leny or her fertility clinic whether or not metformin would be a good option for treatment of your high cystic ovarian syndrome. Also your HDL cholesterol (the good cholesterol) was slightly low.  This can be increased with physical activity.         Molly Vo D.O.

## 2019-02-16 NOTE — TELEPHONE ENCOUNTER
----- Message from Molly Vo D.O. sent at 2/15/2019  4:28 PM PST -----  Please call patient let her know that her labs were significant for elevated hemoglobin A1c.  It puts her in the prediabetes range.  This actually is often seen in people with polycystic ovarian syndrome.  It may be worth it to either discuss with Leny or her fertility clinic whether or not metformin would be a good option for treatment of her high cystic ovarian syndrome..  Also her HDL cholesterol (the good cholesterol) was slightly low.  This can be increased with physical activity.  Please let me know if she has any questions.

## 2019-03-08 ENCOUNTER — OFFICE VISIT (OUTPATIENT)
Dept: MEDICAL GROUP | Facility: PHYSICIAN GROUP | Age: 34
End: 2019-03-08
Payer: COMMERCIAL

## 2019-03-08 VITALS
TEMPERATURE: 97.7 F | SYSTOLIC BLOOD PRESSURE: 102 MMHG | WEIGHT: 230 LBS | HEIGHT: 65 IN | HEART RATE: 108 BPM | BODY MASS INDEX: 38.32 KG/M2 | DIASTOLIC BLOOD PRESSURE: 72 MMHG | OXYGEN SATURATION: 96 %

## 2019-03-08 DIAGNOSIS — F17.210 CIGARETTE NICOTINE DEPENDENCE WITHOUT COMPLICATION: ICD-10-CM

## 2019-03-08 DIAGNOSIS — J06.9 ACUTE UPPER RESPIRATORY INFECTION: ICD-10-CM

## 2019-03-08 LAB
FLUAV+FLUBV AG SPEC QL IA: NORMAL
INT CON NEG: NEGATIVE
INT CON POS: POSITIVE

## 2019-03-08 PROCEDURE — 87804 INFLUENZA ASSAY W/OPTIC: CPT | Performed by: PHYSICIAN ASSISTANT

## 2019-03-08 PROCEDURE — 99214 OFFICE O/P EST MOD 30 MIN: CPT | Performed by: PHYSICIAN ASSISTANT

## 2019-03-08 NOTE — PROGRESS NOTES
Subjective:   Fatemeh De La O is a 34 y.o. female here today for fever, congestion, cough. Is an established patient of mine.    HPI:    Patient presents to the office today with complaints of upper respiratory symptoms.  She states that about 4 days ago, she started to get a bad sore throat and then quickly developed nasal congestion, productive cough of yellowish phlegm, plugged sensation of her ears.  She feels that she has been getting winded more easily lately.  Last night, had fever with body aches and chills.  She denies any sinus pain or pressure.  Has been taking Advil Cold and Sinus as well as emergency for her symptoms.  She does have history of recurrent sinusitis and has an appointment next Friday with ENT to have some type of sinus procedure done.    Patient would also like to discuss smoking cessation.  She had quit smoking completely for period of time but then started back up again.  She has been having difficulty quitting again on her own and is requesting some type of medication to help her.  She has never used any medication in the past but is open to all options.  She has been following with gynecology for infertility and is cycling on Clomid in attempts to get pregnant, but states she is willing to put this on hold until she is able to quit smoking.      Current medicines (including changes today)  Current Outpatient Prescriptions   Medication Sig Dispense Refill   • varenicline (CHANTIX STARTING MONTH PAK) 0.5 MG X 11 & 1 MG X 42 tablet 0.5 mg by mouth once daily for 3 days, then 0.5 mg by mouth twice daily for 4 days, then 1 mg by mouth twice daily. 56 Tab 3   • ibuprofen (MOTRIN) 200 MG Tab Take 400 mg by mouth every 6 hours as needed for Mild Pain.       No current facility-administered medications for this visit.      She  has a past medical history of Allergy; Asthma; and Chronic bronchitis (HCC).    ROS  As per HPI.       Objective:     Blood pressure 102/72, pulse (!) 108,  "temperature 36.5 °C (97.7 °F), height 1.651 m (5' 5\"), weight 104.3 kg (230 lb), SpO2 96 %, not currently breastfeeding. Body mass index is 38.27 kg/m².     Physical Exam:  Constitutional: Alert, well-appearing, no distress.  Skin: Warm, dry, good turgor, no rashes in visible areas.  Eye: Pupils are equal and round, conjunctiva clear, lids normal.  ENMT: External ear canals are clear without erythema, edema, or drainage.  Tympanic membranes are pearly gray bilaterally and without injection or bulging.  Nasal turbinates appear mildly inflamed with some clear rhinorrhea.  Lips without lesions, moist mucus membranes.  No pharyngeal erythema.  Neck: No masses. No submandibular or cervical lymphadenopathy.  Respiratory: Unlabored respiratory effort, SPO2 96% on room air, lungs clear to auscultation, no wheezes, no rhonchi.  Cardiovascular: Normal S1, S2, no murmur.      Assessment and Plan:   The following treatment plan was discussed    1. Acute upper respiratory infection  New problem, uncontrolled.  History and exam are most consistent with acute upper respiratory infection.  Influenza test done today in the office was negative.  Explained to patient that this is more likely to be due to a virus and antibiotics would be ineffective.  Patient was counseled on recommended supportive cares for symptoms.  Discussed that most viral URIs will improve by the 7-10-day kim of illness.  If this is not the case or she feels she is worsening, should follow-up with me.  - POCT Influenza A/B    2. Cigarette nicotine dependence without complication  Chronic issue, currently uncontrolled as she has started smoking again.  Patient was counseled on the available options for smoking cessation including nicotine replacement products, Chantix, and Wellbutrin.  After discussion, patient opts for Chantix.  She denies any history of major psychiatric illness.  We discussed the potential for psychiatric side effects.  If she is to notice " anything, she will discontinue medication and let me know.  We discussed how to take medication.  She is going to touch base with her gynecologist to let her know that she is starting this.  We did discuss that there is not enough research to ensure that this medication is safe in pregnancy.  She is planning on putting plans for pregnancy on hold at the present time and wants to make quitting smoking a priority.  - varenicline (CHANTIX STARTING MONTH ILEANA) 0.5 MG X 11 & 1 MG X 42 tablet; 0.5 mg by mouth once daily for 3 days, then 0.5 mg by mouth twice daily for 4 days, then 1 mg by mouth twice daily.  Dispense: 56 Tab; Refill: 3      Followup: Return for as scheduled.    Leny Garvin P.A.-C.

## 2019-03-08 NOTE — PATIENT INSTRUCTIONS
I highly recommend participation in [facility] of the [facility]'s Smoking Cessation Program. The phone number is [phone].    Varenicline oral tablets  What is this medicine?  VARENICLINE (prieto EN i kleen) is used to help people quit smoking. It can reduce the symptoms caused by stopping smoking. It is used with a patient support program recommended by your physician.  This medicine may be used for other purposes; ask your health care provider or pharmacist if you have questions.  COMMON BRAND NAME(S): Molly  What should I tell my health care provider before I take this medicine?  They need to know if you have any of these conditions:  -bipolar disorder, depression, schizophrenia or other mental illness  -heart disease  -if you often drink alcohol  -kidney disease  -peripheral vascular disease  -seizures  -stroke  -suicidal thoughts, plans, or attempt; a previous suicide attempt by you or a family member  -an unusual or allergic reaction to varenicline, other medicines, foods, dyes, or preservatives  -pregnant or trying to get pregnant  -breast-feeding  How should I use this medicine?  Take this medicine by mouth after eating. Take with a full glass of water. Follow the directions on the prescription label. Take your doses at regular intervals. Do not take your medicine more often than directed.  There are 3 ways you can use this medicine to help you quit smoking; talk to your health care professional to decide which plan is right for you:  1) you can choose a quit date and start this medicine 1 week before the quit date, or,  2) you can start taking this medicine before you choose a quit date, and then pick a quit date between day 8 and 35 days of treatment, or,  3) if you are not sure that you are able or willing to quit smoking right away, start taking this medicine and slowly decrease the amount you smoke as directed by your health care professional with the goal of being cigarette-free by week 12 of treatment.  Stick to your  plan; ask about support groups or other ways to help you remain cigarette-free. If you are motivated to quit smoking and did not succeed during a previous attempt with this medicine for reasons other than side effects, or if you returned to smoking after this treatment, speak with your health care professional about whether another course of this medicine may be right for you.  A special MedGuide will be given to you by the pharmacist with each prescription and refill. Be sure to read this information carefully each time.  Talk to your pediatrician regarding the use of this medicine in children. This medicine is not approved for use in children.  Overdosage: If you think you have taken too much of this medicine contact a poison control center or emergency room at once.  NOTE: This medicine is only for you. Do not share this medicine with others.  What if I miss a dose?  If you miss a dose, take it as soon as you can. If it is almost time for your next dose, take only that dose. Do not take double or extra doses.  What may interact with this medicine?  -alcohol or any product that contains alcohol  -insulin  -other stop smoking aids  -theophylline  -warfarin  This list may not describe all possible interactions. Give your health care provider a list of all the medicines, herbs, non-prescription drugs, or dietary supplements you use. Also tell them if you smoke, drink alcohol, or use illegal drugs. Some items may interact with your medicine.  What should I watch for while using this medicine?  Visit your doctor or health care professional for regular check ups. Ask for ongoing advice and encouragement from your doctor or healthcare professional, friends, and family to help you quit. If you smoke while on this medication, quit again  Your mouth may get dry. Chewing sugarless gum or sucking hard candy, and drinking plenty of water may help. Contact your doctor if the problem does not go away or is severe.  You may get  drowsy or dizzy. Do not drive, use machinery, or do anything that needs mental alertness until you know how this medicine affects you. Do not stand or sit up quickly, especially if you are an older patient. This reduces the risk of dizzy or fainting spells.  Sleepwalking can happen during treatment with this medicine, and can sometimes lead to behavior that is harmful to you, other people, or property. Stop taking this medicine and tell your doctor if you start sleepwalking or have other unusual sleep-related activity.  Decrease the amount of alcoholic beverages that you drink during treatment with this medicine until you know if this medicine affects your ability to tolerate alcohol. Some people have experienced increased drunkenness (intoxication), unusual or sometimes aggressive behavior, or no memory of things that have happened (amnesia) during treatment with this medicine.  The use of this medicine may increase the chance of suicidal thoughts or actions. Pay special attention to how you are responding while on this medicine. Any worsening of mood, or thoughts of suicide or dying should be reported to your health care professional right away.  What side effects may I notice from receiving this medicine?  Side effects that you should report to your doctor or health care professional as soon as possible:  -allergic reactions like skin rash, itching or hives, swelling of the face, lips, tongue, or throat  -acting aggressive, being angry or violent, or acting on dangerous impulses  -breathing problems  -changes in vision  -chest pain or chest tightness  -confusion, trouble speaking or understanding  -new or worsening depression, anxiety, or panic attacks  -extreme increase in activity and talking (mathieu)  -fast, irregular heartbeat  -feeling faint or lightheaded, falls  -fever  -pain in legs when walking  -problems with balance, talking, walking  -redness, blistering, peeling or loosening of the skin, including  inside the mouth  -ringing in ears  -seeing or hearing things that aren't there (hallucinations)  -seizures  -sleepwalking  -sudden numbness or weakness of the face, arm or leg  -thoughts about suicide or dying, or attempts to commit suicide  -trouble passing urine or change in the amount of urine  -unusual bleeding or bruising  -unusually weak or tired  Side effects that usually do not require medical attention (report to your doctor or health care professional if they continue or are bothersome):  -constipation  -headache  -nausea, vomiting  -strange dreams  -stomach gas  -trouble sleeping  This list may not describe all possible side effects. Call your doctor for medical advice about side effects. You may report side effects to FDA at 7-575-FDA-9407.  Where should I keep my medicine?  Keep out of the reach of children.  Store at room temperature between 15 and 30 degrees C (59 and 86 degrees F). Throw away any unused medicine after the expiration date.  NOTE: This sheet is a summary. It may not cover all possible information. If you have questions about this medicine, talk to your doctor, pharmacist, or health care provider.  © 2018 [employer] (2016-09-01 16:14:23)

## 2019-03-11 ENCOUNTER — TELEPHONE (OUTPATIENT)
Dept: MEDICAL GROUP | Facility: PHYSICIAN GROUP | Age: 34
End: 2019-03-11

## 2019-03-11 NOTE — TELEPHONE ENCOUNTER
I received PA request for Chantix from [facility] pharmacy, however the insurance information provided by pharmacy and info we have on file may be incorrect. I tried submitting online and response says patient not found. I also called and they also say coverage for Pt not found. I have called and lvm for Pt to call back to get further information.

## 2019-03-12 NOTE — TELEPHONE ENCOUNTER
Pt states the insurance we have on file is accurate. She is going to contact insurance and find out what's going on.

## 2019-07-02 ENCOUNTER — OFFICE VISIT (OUTPATIENT)
Dept: MEDICAL GROUP | Facility: PHYSICIAN GROUP | Age: 34
End: 2019-07-02
Payer: COMMERCIAL

## 2019-07-02 ENCOUNTER — HOSPITAL ENCOUNTER (OUTPATIENT)
Facility: MEDICAL CENTER | Age: 34
End: 2019-07-02
Attending: NURSE PRACTITIONER
Payer: COMMERCIAL

## 2019-07-02 VITALS
WEIGHT: 229 LBS | HEIGHT: 65 IN | TEMPERATURE: 97.6 F | HEART RATE: 82 BPM | OXYGEN SATURATION: 97 % | BODY MASS INDEX: 38.15 KG/M2 | SYSTOLIC BLOOD PRESSURE: 104 MMHG | DIASTOLIC BLOOD PRESSURE: 78 MMHG

## 2019-07-02 DIAGNOSIS — R39.9 LOWER URINARY TRACT SYMPTOMS: ICD-10-CM

## 2019-07-02 DIAGNOSIS — R39.89 SUSPECTED UTI: ICD-10-CM

## 2019-07-02 LAB
APPEARANCE UR: NORMAL
BILIRUB UR STRIP-MCNC: NORMAL MG/DL
COLOR UR AUTO: NORMAL
GLUCOSE UR STRIP.AUTO-MCNC: NORMAL MG/DL
KETONES UR STRIP.AUTO-MCNC: NORMAL MG/DL
LEUKOCYTE ESTERASE UR QL STRIP.AUTO: NORMAL
NITRITE UR QL STRIP.AUTO: NORMAL
PH UR STRIP.AUTO: 6.5 [PH] (ref 5–8)
PROT UR QL STRIP: NORMAL MG/DL
RBC UR QL AUTO: NORMAL
SP GR UR STRIP.AUTO: 1.01
UROBILINOGEN UR STRIP-MCNC: 0.2 MG/DL

## 2019-07-02 PROCEDURE — 99214 OFFICE O/P EST MOD 30 MIN: CPT | Performed by: PHYSICIAN ASSISTANT

## 2019-07-02 PROCEDURE — 81002 URINALYSIS NONAUTO W/O SCOPE: CPT | Performed by: PHYSICIAN ASSISTANT

## 2019-07-02 PROCEDURE — 87086 URINE CULTURE/COLONY COUNT: CPT

## 2019-07-02 RX ORDER — NITROFURANTOIN 25; 75 MG/1; MG/1
100 CAPSULE ORAL 2 TIMES DAILY
Qty: 10 CAP | Refills: 0 | Status: SHIPPED | OUTPATIENT
Start: 2019-07-02 | End: 2019-07-07

## 2019-07-02 NOTE — PROGRESS NOTES
"Subjective:   Fatemeh De La O is a 34 y.o. female here today for urinary symptoms. Is an established patient of mine.    HPI:    Fatemeh presents to the office today with concern for UTI.  Since Thursday, has been experiencing sharp lower abdominal pains, worse when urinating, as well as  urinary frequency and urgency. Denies fever, chills, hematuria, flank pain, nausea, or vomiting. States has had previous UTIs with the same symptoms. Believes her last infection was sometime last year.    Current medicines (including changes today)  Current Outpatient Prescriptions   Medication Sig Dispense Refill   • nitrofurantoin monohyd macro (MACROBID) 100 MG Cap Take 1 Cap by mouth 2 times a day for 5 days. 10 Cap 0   • varenicline (CHANTIX STARTING MONTH PAK) 0.5 MG X 11 & 1 MG X 42 tablet 0.5 mg by mouth once daily for 3 days, then 0.5 mg by mouth twice daily for 4 days, then 1 mg by mouth twice daily. (Patient not taking: Reported on 7/2/2019) 56 Tab 3   • ibuprofen (MOTRIN) 200 MG Tab Take 400 mg by mouth every 6 hours as needed for Mild Pain.       No current facility-administered medications for this visit.      She  has a past medical history of Allergy; Asthma; and Chronic bronchitis (HCC).    ROS  As per HPI.       Objective:     /78 (BP Location: Right arm, Patient Position: Sitting, BP Cuff Size: Adult)   Pulse 82   Temp 36.4 °C (97.6 °F)   Ht 1.651 m (5' 5\")   Wt 103.9 kg (229 lb)   SpO2 97%  Body mass index is 38.11 kg/m².     Physical Exam:  Constitutional: Alert, well-appearing, no distress.  Skin: Warm, dry, good turgor, no rashes in visible areas.  Eye: Pupils are equal and round, conjunctiva clear, lids normal.  ENMT: Lips without lesions, moist mucus membranes.  Respiratory: Unlabored respiratory effort, lungs clear to auscultation, no wheezes, no rhonchi.  Cardiovascular: Normal S1, S2, no murmur.  Abdomen: Normoactive bowel sounds. Abdomen is soft, non-distended, non-tender throughout. No " CVA tenderness to percussion bilaterally.      Assessment and Plan:   The following treatment plan was discussed    1. Lower urinary tract symptoms  2. Suspected UTI  New problems, uncontrolled. Having classic UTI symptoms. U/A today looks surprisingly normal other than cloudy-appearing urine, but clinically has UTI so will start abx treatment. I have prescribed Macrobid BID x 5 days. I have also ordered urine culture which I explained is confirmatory test for UTI. Patient will be notified of results when back. If in the meantime she goes on to develop high fever, severe abdominal/flank pain, or vomiting, should be seen in ER.  - nitrofurantoin monohyd macro (MACROBID) 100 MG Cap; Take 1 Cap by mouth 2 times a day for 5 days.  Dispense: 10 Cap; Refill: 0  - POCT Urinalysis  - URINE CULTURE(NEW); Future      Followup: Return if symptoms worsen or fail to improve.    Leny Garvin P.A.-C.

## 2019-07-04 LAB
BACTERIA UR CULT: NORMAL
SIGNIFICANT IND 70042: NORMAL
SITE SITE: NORMAL
SOURCE SOURCE: NORMAL

## 2019-07-17 ENCOUNTER — HOSPITAL ENCOUNTER (OUTPATIENT)
Facility: MEDICAL CENTER | Age: 34
End: 2019-07-17
Attending: PHYSICIAN ASSISTANT
Payer: COMMERCIAL

## 2019-07-17 ENCOUNTER — OFFICE VISIT (OUTPATIENT)
Dept: URGENT CARE | Facility: PHYSICIAN GROUP | Age: 34
End: 2019-07-17
Payer: COMMERCIAL

## 2019-07-17 VITALS
OXYGEN SATURATION: 97 % | WEIGHT: 229 LBS | BODY MASS INDEX: 38.15 KG/M2 | HEART RATE: 104 BPM | HEIGHT: 65 IN | TEMPERATURE: 98.6 F | DIASTOLIC BLOOD PRESSURE: 82 MMHG | SYSTOLIC BLOOD PRESSURE: 124 MMHG

## 2019-07-17 DIAGNOSIS — L02.91 ABSCESS: Primary | ICD-10-CM

## 2019-07-17 PROCEDURE — 87205 SMEAR GRAM STAIN: CPT

## 2019-07-17 PROCEDURE — 87070 CULTURE OTHR SPECIMN AEROBIC: CPT

## 2019-07-17 PROCEDURE — 10061 I&D ABSCESS COMP/MULTIPLE: CPT | Performed by: PHYSICIAN ASSISTANT

## 2019-07-17 RX ORDER — SULFAMETHOXAZOLE AND TRIMETHOPRIM 800; 160 MG/1; MG/1
1 TABLET ORAL 2 TIMES DAILY
Qty: 14 TAB | Refills: 0 | Status: SHIPPED | OUTPATIENT
Start: 2019-07-17 | End: 2019-07-24

## 2019-07-17 ASSESSMENT — ENCOUNTER SYMPTOMS
VOMITING: 0
JOINT SWELLING: 0
NAUSEA: 0
FEVER: 0

## 2019-07-18 DIAGNOSIS — L02.91 ABSCESS: ICD-10-CM

## 2019-07-18 LAB
GRAM STN SPEC: NORMAL
SIGNIFICANT IND 70042: NORMAL
SITE SITE: NORMAL
SOURCE SOURCE: NORMAL

## 2019-07-21 LAB
BACTERIA WND AEROBE CULT: ABNORMAL
GRAM STN SPEC: ABNORMAL
SIGNIFICANT IND 70042: ABNORMAL
SITE SITE: ABNORMAL
SOURCE SOURCE: ABNORMAL

## 2020-01-20 ENCOUNTER — OFFICE VISIT (OUTPATIENT)
Dept: URGENT CARE | Facility: PHYSICIAN GROUP | Age: 35
End: 2020-01-20

## 2020-01-20 ENCOUNTER — APPOINTMENT (OUTPATIENT)
Dept: RADIOLOGY | Facility: IMAGING CENTER | Age: 35
End: 2020-01-20
Attending: PHYSICIAN ASSISTANT
Payer: COMMERCIAL

## 2020-01-20 VITALS
RESPIRATION RATE: 16 BRPM | OXYGEN SATURATION: 92 % | WEIGHT: 233 LBS | HEIGHT: 65 IN | HEART RATE: 86 BPM | DIASTOLIC BLOOD PRESSURE: 72 MMHG | TEMPERATURE: 98.6 F | BODY MASS INDEX: 38.82 KG/M2 | SYSTOLIC BLOOD PRESSURE: 118 MMHG

## 2020-01-20 DIAGNOSIS — J45.21 MILD INTERMITTENT ASTHMA WITH EXACERBATION: ICD-10-CM

## 2020-01-20 DIAGNOSIS — J22 LRTI (LOWER RESPIRATORY TRACT INFECTION): ICD-10-CM

## 2020-01-20 DIAGNOSIS — R05.9 COUGH: ICD-10-CM

## 2020-01-20 PROCEDURE — 71046 X-RAY EXAM CHEST 2 VIEWS: CPT | Mod: TC | Performed by: PHYSICIAN ASSISTANT

## 2020-01-20 PROCEDURE — 99214 OFFICE O/P EST MOD 30 MIN: CPT | Performed by: PHYSICIAN ASSISTANT

## 2020-01-20 RX ORDER — METHYLPREDNISOLONE 4 MG/1
TABLET ORAL
Qty: 21 TAB | Refills: 0 | Status: SHIPPED | OUTPATIENT
Start: 2020-01-20 | End: 2021-04-02

## 2020-01-20 RX ORDER — BENZONATATE 100 MG/1
100 CAPSULE ORAL 3 TIMES DAILY PRN
Qty: 60 CAP | Refills: 0 | Status: SHIPPED | OUTPATIENT
Start: 2020-01-20 | End: 2021-04-02

## 2020-01-20 RX ORDER — IPRATROPIUM BROMIDE AND ALBUTEROL SULFATE 2.5; .5 MG/3ML; MG/3ML
3 SOLUTION RESPIRATORY (INHALATION) ONCE
Status: COMPLETED | OUTPATIENT
Start: 2020-01-20 | End: 2020-01-20

## 2020-01-20 RX ORDER — PROMETHAZINE HYDROCHLORIDE AND CODEINE PHOSPHATE 6.25; 1 MG/5ML; MG/5ML
5 SYRUP ORAL EVERY 12 HOURS PRN
Qty: 60 ML | Refills: 0 | Status: SHIPPED | OUTPATIENT
Start: 2020-01-20 | End: 2020-01-27

## 2020-01-20 RX ORDER — ALBUTEROL SULFATE 90 UG/1
2 AEROSOL, METERED RESPIRATORY (INHALATION) EVERY 6 HOURS PRN
Qty: 8.5 G | Refills: 2 | Status: SHIPPED | OUTPATIENT
Start: 2020-01-20 | End: 2021-07-22

## 2020-01-20 RX ORDER — AZITHROMYCIN 250 MG/1
TABLET, FILM COATED ORAL
Qty: 6 TAB | Refills: 0 | Status: SHIPPED | OUTPATIENT
Start: 2020-01-20 | End: 2021-04-02

## 2020-01-20 RX ADMIN — IPRATROPIUM BROMIDE AND ALBUTEROL SULFATE 3 ML: 2.5; .5 SOLUTION RESPIRATORY (INHALATION) at 13:08

## 2020-01-20 ASSESSMENT — ENCOUNTER SYMPTOMS
SPUTUM PRODUCTION: 0
NAUSEA: 0
FEVER: 0
WHEEZING: 1
SORE THROAT: 1
ABDOMINAL PAIN: 0
MYALGIAS: 1
SINUS PAIN: 0
SHORTNESS OF BREATH: 0
CHILLS: 1
VOMITING: 0
COUGH: 1
DIARRHEA: 0

## 2020-01-20 NOTE — PROGRESS NOTES
"Subjective:   Fatemeh De La O  is a 34 y.o. female who presents for Cough (hard to breath, chest congestion,  X over a week)        Cough   This is a new problem. The current episode started in the past 7 days. Associated symptoms include chills, myalgias, a sore throat ( 2'2 coughing) and wheezing. Pertinent negatives include no ear pain, fever, rash or shortness of breath.     Patient comes clinic complaining of last 8 to 9 days persistent coughing and chest congestion.  She notes significant wheezing or production with cough over the last few days.  She notes recent cigarette smoker who quit over the last 3 to 4 weeks.  She notes childhood history of chronic bronchitis and recurrence of asthma exacerbations.  She states at that time she needed regular MDI use some recurrence of antibiotics.  She states she has had less episodes of lower respiratory infection over the last few years.  She notes past medical history of pneumonia some 8 years ago.  She denies nausea vomiting abdominal pain diarrhea or rash.  She notes mild sore throat secondary to coughing and ear fullness without pain.  She has tried over-the-counter cough suppressants with minimal improvement.  She reports needing a new MDI, does not have nebulizer at home.    Review of Systems   Constitutional: Positive for chills. Negative for fever.   HENT: Positive for congestion and sore throat ( 2'2 coughing). Negative for ear pain and sinus pain.    Respiratory: Positive for cough and wheezing. Negative for sputum production and shortness of breath.    Gastrointestinal: Negative for abdominal pain, diarrhea, nausea and vomiting.   Musculoskeletal: Positive for myalgias.   Skin: Negative for rash.     No Known Allergies   I have worn a mask for the entire encounter with this patient.    Objective:   /72   Pulse 86   Temp 37 °C (98.6 °F)   Resp 16   Ht 1.651 m (5' 5\")   Wt 105.7 kg (233 lb)   SpO2 92%   BMI 38.77 kg/m²   Physical " Exam  Vitals signs and nursing note reviewed.   Constitutional:       General: She is not in acute distress.     Appearance: She is well-developed. She is not diaphoretic.   HENT:      Head: Normocephalic and atraumatic.      Right Ear: Tympanic membrane, ear canal and external ear normal.      Left Ear: Tympanic membrane, ear canal and external ear normal.      Nose: Nose normal.      Mouth/Throat:      Pharynx: Uvula midline. Posterior oropharyngeal erythema ( mild PND) present. No oropharyngeal exudate.      Tonsils: No tonsillar abscesses.   Eyes:      General: Lids are normal. No scleral icterus.        Right eye: No discharge.         Left eye: No discharge.      Conjunctiva/sclera: Conjunctivae normal.   Neck:      Musculoskeletal: Neck supple.   Pulmonary:      Effort: Pulmonary effort is normal. No accessory muscle usage or respiratory distress.      Breath sounds: No stridor. Wheezing and rhonchi ( scattered, mild) present. No decreased breath sounds or rales.   Musculoskeletal: Normal range of motion.   Lymphadenopathy:      Cervical: Cervical adenopathy ( mild bilat) present.   Skin:     General: Skin is warm and dry.      Coloration: Skin is not pale.      Findings: No erythema.   Neurological:      Mental Status: She is alert and oriented to person, place, and time. She is not disoriented.   Psychiatric:         Speech: Speech normal.         Behavior: Behavior normal.     CXR -   IMPRESSION:     NEGATIVE TWO VIEWS OF THE CHEST.            Last Resulted: 01/20/20  1:53 PM            POCT HCG - NEG    DUONEB -  reports dramatic improvement in respirations following tx    Assessment/Plan:   1. LRTI (lower respiratory tract infection)  - DX-CHEST-2 VIEWS; Future  - azithromycin (ZITHROMAX) 250 MG Tab; Take as directed on package. Dispense one package.  Dispense: 6 Tab; Refill: 0    2. Cough  - ipratropium-albuterol (DUONEB) nebulizer solution  - benzonatate (TESSALON) 100 MG Cap; Take 1 Cap by mouth 3  times a day as needed for Cough.  Dispense: 60 Cap; Refill: 0  - promethazine-codeine (PHENERGAN-CODEINE) 6.25-10 MG/5ML Syrup; Take 5 mL by mouth every 12 hours as needed for up to 7 days.  Dispense: 60 mL; Refill: 0  - POCT Pregnancy    3. Mild intermittent asthma with exacerbation  - ipratropium-albuterol (DUONEB) nebulizer solution  - methylPREDNISolone (MEDROL DOSEPAK) 4 MG Tablet Therapy Pack; Follow schedule on package instructions.  Dispense: 21 Tab; Refill: 0  - albuterol 108 (90 Base) MCG/ACT Aero Soln inhalation aerosol; Inhale 2 Puffs by mouth every 6 hours as needed for Shortness of Breath.  Dispense: 8.5 g; Refill: 2  Supportive care is reviewed with patient/caregiver - recommend to push PO fluids and electrolytes, Nsaids/tylenol, netti pot/saline irrig, humidifier in home, flonase, ponaris, antihistamine, Cautioned regarding potential side effects of steroid, avoid nsaids while using   take full course of Rx, take with probiotics, observe for resolution  Return to clinic with lack of resolution or progression of symptoms.    Sent w/ MDI and cough suppressant, work note, trend improvement or RTC  ER precautions with any worsening symptoms are reviewed with patient/caregiver and they do express understanding    Differential diagnosis, natural history, supportive care, and indications for immediate follow-up discussed.

## 2020-01-20 NOTE — LETTER
January 20, 2020       Patient: Fatemeh De La O   YOB: 1985   Date of Visit: 1/20/2020         To Whom It May Concern:    It is my medical opinion that Fatemeh De La O should be excused from work for Tuesday and Wednesday due to illness.      If you have any questions or concerns, please don't hesitate to call [phone]          Sincerely,          Miki Simental P.A.-C.  Electronically Signed

## 2020-04-13 ENCOUNTER — OFFICE VISIT (OUTPATIENT)
Dept: URGENT CARE | Facility: CLINIC | Age: 35
End: 2020-04-13
Payer: COMMERCIAL

## 2020-04-13 VITALS
WEIGHT: 224 LBS | BODY MASS INDEX: 38.24 KG/M2 | OXYGEN SATURATION: 98 % | TEMPERATURE: 98.6 F | RESPIRATION RATE: 18 BRPM | DIASTOLIC BLOOD PRESSURE: 84 MMHG | SYSTOLIC BLOOD PRESSURE: 118 MMHG | HEIGHT: 64 IN | HEART RATE: 85 BPM

## 2020-04-13 DIAGNOSIS — J06.9 VIRAL URI: ICD-10-CM

## 2020-04-13 DIAGNOSIS — R05.9 COUGH: ICD-10-CM

## 2020-04-13 LAB
FLUAV+FLUBV AG SPEC QL IA: NEGATIVE
INT CON NEG: NEGATIVE
INT CON POS: POSITIVE

## 2020-04-13 PROCEDURE — 99214 OFFICE O/P EST MOD 30 MIN: CPT | Performed by: PHYSICIAN ASSISTANT

## 2020-04-13 PROCEDURE — 87804 INFLUENZA ASSAY W/OPTIC: CPT | Performed by: PHYSICIAN ASSISTANT

## 2020-04-13 RX ORDER — BENZONATATE 100 MG/1
100 CAPSULE ORAL 3 TIMES DAILY PRN
Qty: 60 CAP | Refills: 0 | Status: SHIPPED | OUTPATIENT
Start: 2020-04-13 | End: 2021-04-02

## 2020-04-13 RX ORDER — LIDOCAINE HYDROCHLORIDE 20 MG/ML
5 SOLUTION OROPHARYNGEAL PRN
Qty: 120 ML | Refills: 0 | Status: SHIPPED | OUTPATIENT
Start: 2020-04-13 | End: 2021-04-02

## 2020-04-13 RX ORDER — FLUTICASONE PROPIONATE 50 MCG
1 SPRAY, SUSPENSION (ML) NASAL DAILY
Qty: 16 G | Refills: 0 | Status: SHIPPED | OUTPATIENT
Start: 2020-04-13 | End: 2021-04-02

## 2020-04-13 ASSESSMENT — ENCOUNTER SYMPTOMS
SWEATS: 0
ABDOMINAL PAIN: 0
WEIGHT LOSS: 0
COUGH: 1
FEVER: 1
SORE THROAT: 0
CHILLS: 1
RHINORRHEA: 1
HEMOPTYSIS: 0
DIARRHEA: 0
WHEEZING: 0
HEARTBURN: 0
NAUSEA: 0
VOMITING: 0
SHORTNESS OF BREATH: 0
MYALGIAS: 1

## 2020-04-13 ASSESSMENT — COPD QUESTIONNAIRES: COPD: 0

## 2020-04-13 ASSESSMENT — FIBROSIS 4 INDEX: FIB4 SCORE: 0.34

## 2020-04-13 NOTE — PROGRESS NOTES
"Subjective:   Fatemeh De La O  is a 35 y.o. female who presents for Cold Symptoms (x4 days-fever,chills,achy,chest discomfort)        Cough   This is a new problem. The current episode started in the past 7 days (4d). The problem has been gradually worsening. The cough is productive of sputum. Associated symptoms include chills, a fever, myalgias, nasal congestion, postnasal drip and rhinorrhea. Pertinent negatives include no ear congestion, ear pain, heartburn, hemoptysis, rash, sore throat, shortness of breath, sweats, weight loss or wheezing. Her past medical history is significant for bronchitis and pneumonia. There is no history of asthma or COPD.     Patient comes clinic complaining of fevers chills for the last 48 hours max temp of 101.  Notes irritation to throat without pain with swallowing water.  Notes mild cough \"2 or 3 times an hour\".  Notes cough is fairly dry without wheezing or much production.  Denies ear pain.  Denies nausea vomiting abdominal pain diarrhea or rash.  Denies known exposure to individuals with coronavirus.  Notes history of remote pneumonia years ago more recent bronchitis in January.  Denies history of asthma.  Has used Advil for fevers.    Review of Systems   Constitutional: Positive for chills and fever. Negative for weight loss.   HENT: Positive for postnasal drip and rhinorrhea. Negative for ear pain and sore throat.    Respiratory: Positive for cough. Negative for hemoptysis, shortness of breath and wheezing.    Gastrointestinal: Negative for abdominal pain, diarrhea, heartburn, nausea and vomiting.   Musculoskeletal: Positive for myalgias.   Skin: Negative for rash.     No Known Allergies   I have worn an N95 mask, gloves and eye protection for the entire encounter with this patient.    Objective:   /84 (BP Location: Left arm)   Pulse 85   Temp 37 °C (98.6 °F) (Temporal)   Resp 18   Ht 1.626 m (5' 4\")   Wt 101.6 kg (224 lb)   LMP 04/10/2020 (Exact Date)   " SpO2 98%   BMI 38.45 kg/m²   Physical Exam  Vitals signs and nursing note reviewed.   Constitutional:       General: She is not in acute distress.     Appearance: She is well-developed. She is not diaphoretic.   HENT:      Head: Normocephalic and atraumatic.      Right Ear: Tympanic membrane, ear canal and external ear normal.      Left Ear: Tympanic membrane, ear canal and external ear normal.      Nose: Nose normal.      Mouth/Throat:      Pharynx: Uvula midline. Posterior oropharyngeal erythema ( mild PND) present. No oropharyngeal exudate.      Tonsils: No tonsillar abscesses.   Eyes:      General: Lids are normal. No scleral icterus.        Right eye: No discharge.         Left eye: No discharge.      Conjunctiva/sclera: Conjunctivae normal.   Neck:      Musculoskeletal: Neck supple.   Pulmonary:      Effort: Pulmonary effort is normal. No respiratory distress.      Breath sounds: No decreased breath sounds, wheezing, rhonchi or rales.   Musculoskeletal: Normal range of motion.   Lymphadenopathy:      Cervical: Cervical adenopathy ( mild bilat) present.   Skin:     General: Skin is warm and dry.      Coloration: Skin is not pale.      Findings: No erythema.   Neurological:      Mental Status: She is alert and oriented to person, place, and time. She is not disoriented.   Psychiatric:         Speech: Speech normal.         Behavior: Behavior normal.     POCT flu - NEG      Assessment/Plan:   1. Cough    2. Viral URI  - POCT Influenza A/B  Supportive care is reviewed with patient/caregiver - recommend to push PO fluids and electrolytes, Nsaids/tylenol, netti pot/saline irrig, humidifier in home, flonase,   Return to clinic with lack of resolution or progression of symptoms.  Sent w/ viral URI AVS    Differential diagnosis, natural history, supportive care, and indications for immediate follow-up discussed.

## 2020-04-23 ENCOUNTER — HOSPITAL ENCOUNTER (EMERGENCY)
Facility: MEDICAL CENTER | Age: 35
End: 2020-04-23
Attending: EMERGENCY MEDICINE

## 2020-04-23 VITALS
WEIGHT: 226.85 LBS | DIASTOLIC BLOOD PRESSURE: 75 MMHG | HEIGHT: 65 IN | BODY MASS INDEX: 37.8 KG/M2 | RESPIRATION RATE: 19 BRPM | OXYGEN SATURATION: 97 % | HEART RATE: 72 BPM | TEMPERATURE: 98.2 F | SYSTOLIC BLOOD PRESSURE: 117 MMHG

## 2020-04-23 DIAGNOSIS — M79.605 PAIN IN BOTH LOWER EXTREMITIES: ICD-10-CM

## 2020-04-23 DIAGNOSIS — M79.604 PAIN IN BOTH LOWER EXTREMITIES: ICD-10-CM

## 2020-04-23 DIAGNOSIS — R20.8 ALLODYNIA: ICD-10-CM

## 2020-04-23 LAB
ALBUMIN SERPL BCP-MCNC: 3.9 G/DL (ref 3.2–4.9)
ALBUMIN/GLOB SERPL: 1.3 G/DL
ALP SERPL-CCNC: 47 U/L (ref 30–99)
ALT SERPL-CCNC: 39 U/L (ref 2–50)
ANION GAP SERPL CALC-SCNC: 14 MMOL/L (ref 7–16)
AST SERPL-CCNC: 20 U/L (ref 12–45)
BASOPHILS # BLD AUTO: 0.4 % (ref 0–1.8)
BASOPHILS # BLD: 0.04 K/UL (ref 0–0.12)
BILIRUB SERPL-MCNC: 0.3 MG/DL (ref 0.1–1.5)
BUN SERPL-MCNC: 20 MG/DL (ref 8–22)
CALCIUM SERPL-MCNC: 8.9 MG/DL (ref 8.4–10.2)
CHLORIDE SERPL-SCNC: 104 MMOL/L (ref 96–112)
CK SERPL-CCNC: 69 U/L (ref 0–154)
CO2 SERPL-SCNC: 21 MMOL/L (ref 20–33)
CREAT SERPL-MCNC: 0.79 MG/DL (ref 0.5–1.4)
EKG IMPRESSION: NORMAL
EOSINOPHIL # BLD AUTO: 0.08 K/UL (ref 0–0.51)
EOSINOPHIL NFR BLD: 0.8 % (ref 0–6.9)
ERYTHROCYTE [DISTWIDTH] IN BLOOD BY AUTOMATED COUNT: 39.8 FL (ref 35.9–50)
GLOBULIN SER CALC-MCNC: 2.9 G/DL (ref 1.9–3.5)
GLUCOSE SERPL-MCNC: 101 MG/DL (ref 65–99)
HCG SERPL QL: NEGATIVE
HCT VFR BLD AUTO: 40.6 % (ref 37–47)
HGB BLD-MCNC: 13.5 G/DL (ref 12–16)
IMM GRANULOCYTES # BLD AUTO: 0.05 K/UL (ref 0–0.11)
IMM GRANULOCYTES NFR BLD AUTO: 0.5 % (ref 0–0.9)
LYMPHOCYTES # BLD AUTO: 3.78 K/UL (ref 1–4.8)
LYMPHOCYTES NFR BLD: 36.8 % (ref 22–41)
MCH RBC QN AUTO: 29 PG (ref 27–33)
MCHC RBC AUTO-ENTMCNC: 33.3 G/DL (ref 33.6–35)
MCV RBC AUTO: 87.3 FL (ref 81.4–97.8)
MONOCYTES # BLD AUTO: 0.74 K/UL (ref 0–0.85)
MONOCYTES NFR BLD AUTO: 7.2 % (ref 0–13.4)
NEUTROPHILS # BLD AUTO: 5.58 K/UL (ref 2–7.15)
NEUTROPHILS NFR BLD: 54.3 % (ref 44–72)
NRBC # BLD AUTO: 0 K/UL
NRBC BLD-RTO: 0 /100 WBC
PLATELET # BLD AUTO: 393 K/UL (ref 164–446)
PMV BLD AUTO: 8.5 FL (ref 9–12.9)
POTASSIUM SERPL-SCNC: 3.9 MMOL/L (ref 3.6–5.5)
PROT SERPL-MCNC: 6.8 G/DL (ref 6–8.2)
RBC # BLD AUTO: 4.65 M/UL (ref 4.2–5.4)
SODIUM SERPL-SCNC: 139 MMOL/L (ref 135–145)
WBC # BLD AUTO: 10.3 K/UL (ref 4.8–10.8)

## 2020-04-23 PROCEDURE — 85025 COMPLETE CBC W/AUTO DIFF WBC: CPT

## 2020-04-23 PROCEDURE — 93005 ELECTROCARDIOGRAM TRACING: CPT | Performed by: EMERGENCY MEDICINE

## 2020-04-23 PROCEDURE — 82550 ASSAY OF CK (CPK): CPT

## 2020-04-23 PROCEDURE — 96372 THER/PROPH/DIAG INJ SC/IM: CPT

## 2020-04-23 PROCEDURE — 99284 EMERGENCY DEPT VISIT MOD MDM: CPT

## 2020-04-23 PROCEDURE — 80053 COMPREHEN METABOLIC PANEL: CPT

## 2020-04-23 PROCEDURE — 700111 HCHG RX REV CODE 636 W/ 250 OVERRIDE (IP): Performed by: EMERGENCY MEDICINE

## 2020-04-23 PROCEDURE — 84703 CHORIONIC GONADOTROPIN ASSAY: CPT

## 2020-04-23 PROCEDURE — 93005 ELECTROCARDIOGRAM TRACING: CPT

## 2020-04-23 RX ORDER — KETOROLAC TROMETHAMINE 30 MG/ML
15 INJECTION, SOLUTION INTRAMUSCULAR; INTRAVENOUS ONCE
Status: COMPLETED | OUTPATIENT
Start: 2020-04-23 | End: 2020-04-23

## 2020-04-23 RX ORDER — KETOROLAC TROMETHAMINE 30 MG/ML
INJECTION, SOLUTION INTRAMUSCULAR; INTRAVENOUS
Status: DISCONTINUED
Start: 2020-04-23 | End: 2020-04-23 | Stop reason: HOSPADM

## 2020-04-23 RX ADMIN — KETOROLAC TROMETHAMINE 15 MG: 30 INJECTION, SOLUTION INTRAMUSCULAR at 05:55

## 2020-04-23 ASSESSMENT — PAIN DESCRIPTION - DESCRIPTORS: DESCRIPTORS: TENDER

## 2020-04-23 ASSESSMENT — FIBROSIS 4 INDEX: FIB4 SCORE: 0.34

## 2020-04-23 NOTE — ED PROVIDER NOTES
ED Provider Note    CHIEF COMPLAINT  Chief Complaint   Patient presents with   • Leg Pain     third day of pain in legs bilateral    • Rash     bilateral legs starting Monday        HPI  Fatemeh De La O is a 35 y.o. female who presents with chief complaint of bilateral lower extremity pain.  Patient reports that she has had bilateral lower extremity pain for the last 3 days.  She reports the pain is ongoing and worse after she has been lying down or resting for a while.  She denies any worsening on exertion or decreased exertional tolerance secondary to pain.  She walks daily without any difficulty.  She denies any associated leg edema.  She does report that she did feel like her legs were more red earlier today but this is since resolved.  She reports a relatively similar symptom in her arms bilaterally as well but this is very mild.  Patient denies any associated neck or back pain.  Patient denies any associated weakness or numbness or changes in coordination.  Of note patient was seen on the 13th for a suspected viral illness, she was flu negative, COVID-19 testing at that time was deferred, patient reports that she had her last fever on around the 15th and is been without any cough, fever, myalgias, sore throat or runny nose for approximately 1 week.  Patient denies any associated abdominal pain.  She reports she is not pregnant.  She reports that she developed some sporadic and fleeting chest pain earlier this morning that has since resolved and not recurred.  Denies any hemoptysis  denies surgery, fracture or casted extremity within the last mo  denies use of estrogen containing OCP  denies hx of VTE. denies active or recently active ca      REVIEW OF SYSTEMS  ROS  See HPI for further details. All other systems are negative.     PAST MEDICAL HISTORY   has a past medical history of Allergy, Asthma, and Chronic bronchitis (HCC).    SOCIAL HISTORY  Social History     Tobacco Use   • Smoking status: Current  Some Day Smoker     Packs/day: 0.25     Years: 12.00     Pack years: 3.00     Types: Cigarettes   • Smokeless tobacco: Never Used   • Tobacco comment: has been cutting down and wants to quit   Substance and Sexual Activity   • Alcohol use: Yes     Alcohol/week: 1.2 - 1.8 oz     Types: 2 - 3 Glasses of wine per week   • Drug use: No   • Sexual activity: Yes     Partners: Male     Comment: None - trying to get pregnant       SURGICAL HISTORY   has a past surgical history that includes dental extraction(s).    CURRENT MEDICATIONS  Home Medications    **Home medications have not yet been reviewed for this encounter**         ALLERGIES  No Known Allergies    PHYSICAL EXAM  Physical Exam   Constitutional: She is oriented to person, place, and time. She appears well-developed and well-nourished.   HENT:   Head: Normocephalic and atraumatic.   Eyes: Conjunctivae are normal.   Neck: Normal range of motion. Neck supple.   Cardiovascular: Normal rate and regular rhythm.   Pulmonary/Chest: Effort normal and breath sounds normal.   Abdominal: Soft. Bowel sounds are normal. She exhibits no distension. There is no abdominal tenderness. There is no rebound.   Musculoskeletal:      Comments: Full range of motion of shoulders elbows wrists, hips knees and ankles without any pain evoked, distal pulses are 2+, cap refill is instantaneous, compartments are soft, no overlying rash or skin changes   Neurological: She is alert and oriented to person, place, and time.   Skin: Skin is warm and dry. No rash noted.   Psychiatric: She has a normal mood and affect. Her behavior is normal.     EKG is normal sinus rhythm, normal axis, normal intervals, no ST changes consistent with acute regional ischemia    COURSE & MEDICAL DECISION MAKING  Pertinent Labs & Imaging studies reviewed. (See chart for details)    Patient here with vague arm and leg pain following a viral illness, her symptoms are bilateral, she has no neurologic sequelae,  neurovascularly patient is intact without any issue at this point.  Possible post viral myositis, will check CK.  Patient without any weakness to suggest Uma Barré.  Patient without any ongoing chest pain, shortness of breath, decreased exertional capacity.  DVT or PE is highly unlikely especially in the bilateral symptoms of all 4 limbs.  Patient's EKG is reassuring without any evidence of acute ischemia.  We will also check for possible electrolyte abnormality with chemistry.  Patient symptoms are highly atypical of ACS pulmonary embolus, she is PERC negative.  Patient's basic labs are unremarkable and reassuring  I have discussed return precautions with patient    The patient will return for worsening symptoms and is stable at the time of discharge. The patient verbalizes understanding and will comply.    FINAL IMPRESSION     1. Allodynia    2. Pain in both lower extremities               Electronically signed by: Kwesi Marrero M.D., 4/23/2020 4:30 AM

## 2020-04-23 NOTE — DISCHARGE INSTRUCTIONS
Your laboratory work-up today was normal, this may be related to the viral illness he recently had.  You may take Tylenol for pain, 2 tabs every 6 hours as needed.  If symptoms worsen return to the emergency department.  If you develop a fever, shortness of breath or find that you cannot walk without developing chest pain or significant shortness of breath return to the emergency department.

## 2020-04-23 NOTE — ED TRIAGE NOTES
"Pt states pain in bilateral LE with onset since Monday. Pt describes it as \"sensitive\". Pt states Numbness and tingling sensation in both ankles. Pt able to bear weight. Pt states same senation in bilateral arms. Pt states she was seen at  about 2 weeks ago for a fever lasting 3 days. Pt denies SOB. Pt states she woke up with slight chest pain but denies chest pain at the moment.     "

## 2020-12-08 ENCOUNTER — HOSPITAL ENCOUNTER (OUTPATIENT)
Dept: LAB | Facility: MEDICAL CENTER | Age: 35
End: 2020-12-08
Attending: OBSTETRICS & GYNECOLOGY
Payer: COMMERCIAL

## 2020-12-08 LAB — PROGEST SERPL-MCNC: 0.14 NG/ML

## 2020-12-08 PROCEDURE — 36415 COLL VENOUS BLD VENIPUNCTURE: CPT

## 2020-12-08 PROCEDURE — 84144 ASSAY OF PROGESTERONE: CPT

## 2021-02-25 ENCOUNTER — OFFICE VISIT (OUTPATIENT)
Dept: URGENT CARE | Facility: PHYSICIAN GROUP | Age: 36
End: 2021-02-25
Payer: COMMERCIAL

## 2021-02-25 VITALS
WEIGHT: 215 LBS | HEART RATE: 88 BPM | HEIGHT: 64 IN | BODY MASS INDEX: 36.7 KG/M2 | SYSTOLIC BLOOD PRESSURE: 110 MMHG | TEMPERATURE: 98.7 F | DIASTOLIC BLOOD PRESSURE: 76 MMHG | OXYGEN SATURATION: 97 % | RESPIRATION RATE: 18 BRPM

## 2021-02-25 DIAGNOSIS — N92.6 MISSED PERIOD: ICD-10-CM

## 2021-02-25 DIAGNOSIS — O99.891 ASYMPTOMATIC BACTERIURIA DURING PREGNANCY: ICD-10-CM

## 2021-02-25 DIAGNOSIS — Z32.01 POSITIVE PREGNANCY TEST: ICD-10-CM

## 2021-02-25 DIAGNOSIS — R82.71 ASYMPTOMATIC BACTERIURIA DURING PREGNANCY: ICD-10-CM

## 2021-02-25 LAB
APPEARANCE UR: CLEAR
BILIRUB UR STRIP-MCNC: NEGATIVE MG/DL
COLOR UR AUTO: YELLOW
GLUCOSE UR STRIP.AUTO-MCNC: NEGATIVE MG/DL
INT CON NEG: NEGATIVE
INT CON POS: POSITIVE
KETONES UR STRIP.AUTO-MCNC: NEGATIVE MG/DL
LEUKOCYTE ESTERASE UR QL STRIP.AUTO: NORMAL
NITRITE UR QL STRIP.AUTO: NEGATIVE
PH UR STRIP.AUTO: 6 [PH] (ref 5–8)
POC URINE PREGNANCY TEST: POSITIVE
PROT UR QL STRIP: NEGATIVE MG/DL
RBC UR QL AUTO: NORMAL
SP GR UR STRIP.AUTO: 1.03
UROBILINOGEN UR STRIP-MCNC: 0.2 MG/DL

## 2021-02-25 PROCEDURE — 81002 URINALYSIS NONAUTO W/O SCOPE: CPT | Performed by: PHYSICIAN ASSISTANT

## 2021-02-25 PROCEDURE — 99214 OFFICE O/P EST MOD 30 MIN: CPT | Performed by: PHYSICIAN ASSISTANT

## 2021-02-25 PROCEDURE — 81025 URINE PREGNANCY TEST: CPT | Performed by: PHYSICIAN ASSISTANT

## 2021-02-25 ASSESSMENT — ENCOUNTER SYMPTOMS
EYE DISCHARGE: 0
VOMITING: 0
NAUSEA: 0
EYE REDNESS: 0
FEVER: 0
SORE THROAT: 0
SHORTNESS OF BREATH: 0
HEADACHES: 0
COUGH: 0

## 2021-02-25 ASSESSMENT — FIBROSIS 4 INDEX: FIB4 SCORE: 0.29

## 2021-02-26 ENCOUNTER — TELEPHONE (OUTPATIENT)
Dept: URGENT CARE | Facility: PHYSICIAN GROUP | Age: 36
End: 2021-02-26

## 2021-02-26 ENCOUNTER — HOSPITAL ENCOUNTER (OUTPATIENT)
Dept: LAB | Facility: MEDICAL CENTER | Age: 36
End: 2021-02-26
Attending: PHYSICIAN ASSISTANT
Payer: COMMERCIAL

## 2021-02-26 ENCOUNTER — HOSPITAL ENCOUNTER (OUTPATIENT)
Facility: MEDICAL CENTER | Age: 36
End: 2021-02-26
Attending: PHYSICIAN ASSISTANT
Payer: COMMERCIAL

## 2021-02-26 DIAGNOSIS — Z32.01 POSITIVE PREGNANCY TEST: ICD-10-CM

## 2021-02-26 LAB — B-HCG SERPL-ACNC: 1663 MIU/ML (ref 0–5)

## 2021-02-26 PROCEDURE — 84702 CHORIONIC GONADOTROPIN TEST: CPT

## 2021-02-26 PROCEDURE — 87086 URINE CULTURE/COLONY COUNT: CPT

## 2021-02-26 PROCEDURE — 36415 COLL VENOUS BLD VENIPUNCTURE: CPT

## 2021-02-26 NOTE — PROGRESS NOTES
Subjective:      Fatemeh De La O is a 36 y.o. female who presents with Other (patient has a positive test at home. she is here to get an order to get a blood test. )            HPI  This is a new problem.  The patient presents to clinic secondary to possible pregnancy.  The patient states she missed her period x1 week ago. The patient reports a history of irregular periods, but states she is typically has her menstrual period by the 18th of every month.  The patient states after she missed her.  She took a home pregnancy test.  The patient states she was experiencing symptoms of possible pregnancy, including breast tenderness and fatigue.  The patient states her home pregnancy test was positive.  The patient took 2 additional home pregnancy test, which were also positive.  The patient reports no pelvic pain.  No abnormal vaginal bleeding.  No abnormal vaginal discharge.  No urinary symptoms.  No dysuria.  No hematuria.  No urinary frequency.  No urinary urgency.  No fever.  No nausea/vomiting.  The patient has not taken any OTC medications.  The patient states she has an upcoming appointment with her OB/GYN at the end of March.  The patient states that she has been undergoing fertility treatments for several years trying to conceive.  The patient is requesting a blood test at this time to confirm her pregnancy.    PMH:  has a past medical history of Allergy, Asthma, and Chronic bronchitis (HCC).  MEDS:   Current Outpatient Medications:   •  lidocaine (XYLOCAINE) 2 % Solution, Take 5 mL by mouth as needed for Throat/Mouth Pain (q6hr PRN throat pain, ok to rinse and spit or swallow)., Disp: 120 mL, Rfl: 0  •  fluticasone (FLONASE) 50 MCG/ACT nasal spray, Spray 1 Spray in nose every day., Disp: 16 g, Rfl: 0  •  benzonatate (TESSALON) 100 MG Cap, Take 1 Cap by mouth 3 times a day as needed for Cough., Disp: 60 Cap, Rfl: 0  •  methylPREDNISolone (MEDROL DOSEPAK) 4 MG Tablet Therapy Pack, Follow schedule on package  "instructions., Disp: 21 Tab, Rfl: 0  •  albuterol 108 (90 Base) MCG/ACT Aero Soln inhalation aerosol, Inhale 2 Puffs by mouth every 6 hours as needed for Shortness of Breath., Disp: 8.5 g, Rfl: 2  •  benzonatate (TESSALON) 100 MG Cap, Take 1 Cap by mouth 3 times a day as needed for Cough., Disp: 60 Cap, Rfl: 0  •  azithromycin (ZITHROMAX) 250 MG Tab, Take as directed on package. Dispense one package., Disp: 6 Tab, Rfl: 0  •  ibuprofen (MOTRIN) 200 MG Tab, Take 400 mg by mouth every 6 hours as needed for Mild Pain., Disp: , Rfl:   ALLERGIES: No Known Allergies  SURGHX:   Past Surgical History:   Procedure Laterality Date   • DENTAL EXTRACTION(S)       SOCHX:  reports that she has been smoking cigarettes. She has a 3.00 pack-year smoking history. She has never used smokeless tobacco. She reports current alcohol use of about 1.2 - 1.8 oz of alcohol per week. She reports that she does not use drugs.  FH: Family history was reviewed, no pertinent findings to report        Review of Systems   Constitutional: Negative for fever.   HENT: Negative for congestion, ear pain and sore throat.    Eyes: Negative for discharge and redness.   Respiratory: Negative for cough and shortness of breath.    Cardiovascular: Negative for chest pain and leg swelling.   Gastrointestinal: Negative for nausea and vomiting.   Musculoskeletal: Negative for joint pain.   Skin: Negative for rash.   Neurological: Negative for headaches.          Objective:     /76   Pulse 88   Temp 37.1 °C (98.7 °F) (Temporal)   Resp 18   Ht 1.626 m (5' 4\")   Wt 97.5 kg (215 lb)   SpO2 97%   BMI 36.90 kg/m²      Physical Exam  Constitutional:       General: She is not in acute distress.     Appearance: Normal appearance. She is not ill-appearing.   HENT:      Head: Normocephalic and atraumatic.      Right Ear: External ear normal.      Left Ear: External ear normal.      Nose: Nose normal.      Mouth/Throat:      Mouth: Mucous membranes are moist.      " Pharynx: Oropharynx is clear. No posterior oropharyngeal erythema.   Eyes:      Extraocular Movements: Extraocular movements intact.      Conjunctiva/sclera: Conjunctivae normal.   Cardiovascular:      Rate and Rhythm: Normal rate and regular rhythm.      Heart sounds: Normal heart sounds.   Pulmonary:      Effort: Pulmonary effort is normal. No respiratory distress.      Breath sounds: Normal breath sounds. No wheezing.   Abdominal:      Palpations: Abdomen is soft.      Tenderness: There is no abdominal tenderness. There is no right CVA tenderness or left CVA tenderness.   Musculoskeletal:         General: Normal range of motion.      Cervical back: Normal range of motion and neck supple.   Skin:     General: Skin is warm and dry.   Neurological:      Mental Status: She is alert and oriented to person, place, and time.            Progress:  Results for orders placed or performed in visit on 02/25/21   POCT Urinalysis   Result Value Ref Range    POC Color Yellow Negative    POC Appearance Clear Negative    POC Leukocyte Esterase Small Negative    POC Nitrites Negative Negative    POC Urobiligen 0.2 Negative (0.2) mg/dL    POC Protein Negative Negative mg/dL    POC Urine PH 6.0 5.0 - 8.0    POC Blood Trace Negative    POC Specific Gravity 1.030 <1.005 - >1.030    POC Ketones Negative Negative mg/dL    POC Bilirubin Negative Negative mg/dL    POC Glucose Negative Negative mg/dL   POCT Pregnancy   Result Value Ref Range    POC Urine Pregnancy Test Positive Negative    Internal Control Positive Positive     Internal Control Negative Negative      Quantitative hCG - pending    Urine Culture - pending      Assessment/Plan:        1. Missed period  - POCT Urinalysis  - POCT Pregnancy    2. Positive pregnancy test  - HCG QUANTITATIVE; Future    3. Asymptomatic bacteriuria during pregnancy  - URINE CULTURE(NEW); Future    The patient's presenting symptoms and physical exam findings are consistent with a missed period.  The  patient states she took a home pregnancy test, which was positive.  The patient states she is currently experiencing symptoms of pregnancy, including breast tenderness and fatigue.  The patient reports no pelvic pain or abnormal vaginal bleeding at this time.  The patient's physical exam today in clinic was normal.  The patient's lungs were clear to auscultation without wheezing, her pulse ox was within normal limits.  The patient's abdomen was soft and nontender.  No CVA tenderness was appreciated.  The patient appears in no acute distress.  The patient's vital signs are stable and within normal limits.  She is afebrile today in clinic.  The patient's POCT pregnancy test today in clinic was positive.  The patient's POCT urinalysis showed small leukocyte esterace and trace blood.  The patient is currently not experiencing any UTI-like symptoms.  Will culture the patient's urine to rule out a possible bacterial infection and to further assess for possible asymptomatic bacteriuria during pregnancy.  Will call the patient with results of her urine culture, and treat accordingly.  The patient is requesting a blood test at this time to confirm her pregnancy.  Will order a quantitative hCG to further evaluate the patient's positive pregnancy test.  Will call the patient with results of her quantitative hCG.  Advised patient to follow-up with her OB/GYN as scheduled.  Counseled the patient on appropriate OTC medications to take during pregnancy.  Advised the patient to start taking a prenatal vitamin containing folic acid.  Recommend OTC medications and supportive care for symptomatic management.  Recommend the patient follow-up with her PCP as needed.  Discussed return precautions with the patient, and she verbalized understanding.    Differential diagnoses, supportive care, and indications for immediate follow-up discussed with patient.   Instructed to return to clinic or nearest emergency department for any change in  condition, further concerns, or worsening of symptoms.    OTC Tylenol for fever/discomfort.  --Advised the patient to avoid NSAIDs in pregnancy  Drink plenty of fluids  Follow-up with OB/GYN as scheduled  Follow-up with PCP as needed  Return to clinic or go to the ED if symptoms worsen or fail to improve, or if the patient should develop worsening/increasing/persistent pelvic pain, abnormal vaginal bleeding, abnormal vaginal discharge, nausea/vomiting, urinary symptoms, dysuria, hematuria, fever/chills, and/or any concerning symptoms.    Discussed plan with the patient, and she agrees to the above.     I personally reviewed prior external notes and test results pertinent to today's visit.  I have independently reviewed and interpreted all diagnostics ordered during this urgent care visit.     Time spent evaluating this patient was at least 30 minutes and includes preparing for visit, obtaining history, exam and evaluation, ordering labs/tests/procedures/medications, independent interpretation, and counseling/education.    Please note that this dictation was created using voice recognition software. I have made every reasonable attempt to correct obvious errors, but I expect that there may be errors of grammar and possibly content that I did not discover before finalizing the note.     This note was electronically signed by Joy Sanchez PA-C

## 2021-02-26 NOTE — PATIENT INSTRUCTIONS
Pregnancy and Medications  Most of the time, medicine a pregnant woman is taking does not enter the fetus, but sometimes it can. This may cause damage or birth defects. The risk of damage being done to a fetus is the greatest in the first few weeks of pregnancy. This is when major organs are developing. If you are taking any prescription, over-the-counter or herbal medicines, it is best to talk to your caregiver about the medications you are taking before getting pregnant. If you become pregnant, stop taking over-the-counter and herbal medications right away. Tell your caregiver what you were taking. Also, tell him/her medications, if any, you took before knowing you were pregnant. Never take any drugs during pregnancy unless your caregiver gives you permission.  Other things like caffeine, vitamins, herbal teas and remedies can affect the growing fetus. Talk to your caregiver about cutting down on caffeine. Also, ask what type of vitamins you need to take. Never use any herbal product without talking to your caregiver first.   MEDICINES THAT ARE NOT SAFE TO TAKE DURING PREGNANCY   · Category A - drugs that have been tested for safety during pregnancy and have been found to be safe. This includes drugs such as:  · Folic acid.  · Vitamin B6.  · Thyroid medicine in moderation or in prescribed doses.  · Category B - drugs that have been used a lot during pregnancy and do not appear to cause major birth defects or other problems. This includes drugs such as:  · Some antibiotics.  · Acetaminophen (Tylenol®).  · Aspartame (artificial sweetener).  · Famotidine (Pepcid®).  · Prednisone (cortisone).  · Insulin (for diabetes).  · Ibuprofen (Advil®, Motrin®) before the third trimester. Pregnant women should not take ibuprofen during the last three months of pregnancy.  · Category C - drugs that are more likely to cause problems for the mother or fetus. It also includes drugs for which safety studies have not been finished. The  majority of these drugs do not have safety studies in progress. These drugs often come with a warning that they should be used only if the benefits of taking them outweigh the risks. This is something a woman would need to carefully discuss with her caregiver. These drugs include:  · Prochlorperzaine (Compazine®).  · Sudafed®.  · Fluconazole (Diflucan®).  · Ciprofloxacin (Cipro®).  · Some antidepressants are also included in this group.  · Category D - drugs that have clear health risks for the fetus. They include:  · Alcohol.  · Lithium (used to treat manic depression).  · Phenytoin (Dilantin®).  · Most chemotherapy drugs to treat cancer. In some cases, chemotherapy drugs are given during pregnancy.  · Category X - drugs that have been shown to cause birth defects. They should never be taken during pregnancy. These include:  · Drugs to treat skin conditions like cystic acne (Accutane®) and psoriasis (Tegison® or Soriatane®).  · Thalidomide (a sedative).  · Diethylstilbestrol or MAURILIO.  No medication is considered 100% safe to take when pregnant because everyone reacts to drugs differently.  Aspirin and other drugs containing salicylate are not recommended during pregnancy, especially during the last three months because it can cause bleeding. In rare cases, a woman's caregiver may want her to use these types of drugs under close watch. Acetylsalicylate, a common ingredient in many over-the-counter painkillers, may make a pregnancy last longer. It may cause severe bleeding before and after delivery.   SHOULD I AVOID TAKING ANY MEDICINE WHILE I AM PREGNANT?   Whether or not you should continue taking medicine during pregnancy is a serious question. However, if you stop taking medicine that you need, this could harm both you and your baby. Talk to your caregiver about if the benefits outweigh the risk for you and your baby.   Pregnant and nursing women who need medication for psychiatric conditions should consult with  their obstetrician, pediatrician and mental health care provider before taking any medication.  NATURAL MEDICATIONS OR HERBAL REMEDIES WHEN YOU ARE PREGNANT  While some herbal remedies claim they will help with pregnancy, there are no studies to prove these claims are true. Likewise, there are very few studies to look at how safe and effective herbal remedies are during pregnancy. Do not take any herbal products without talking to your caregiver first. These products may contain agents that could harm you and the growing fetus. They could cause problems with your pregnancy.   If you think or know that your mother took diethylstilbesterol (MAURILIO), a factory made estrogen, when she was pregnant with you, talk with your caregiver right away. Ask her or him about:  · What types of tests you may need.  · How often they need to be done.  · Anything else you may need to do to make sure you do not develop any problems.  A woman whose mother was given MAURILIO when pregnant should be followed and screened for abnormalities of her female organs all through her life.  OVER-THE-COUNTER MEDICATIONS THAT ARE SAFE TO TAKE DURING PREGNANCY:  Any medication taken during pregnancy should be taken only with the permission of your caregiver.  · Allergy (Benadryl®).  · Cold and Flu, Tylenol (acetaminophen). Tylenol Cold, warm salt water gargles, saline nasal drops.  · Constipation (Metamucil®, Citrucil®, Fiberall/Fobricon, Colace®, Milk of Magnesia® , Senekot®).  · Diarrhea (Kaopectate®, Immodium®, Parepectolin). You should not take these in the first trimester and only take the medication for 24 hours. Call your caregiver if you still have the diarrhea.  · Headache (Tylenol).  · Ointment for cuts and scrapes (J & J, Bacitracin®, Neosporin®).  · Heartburn (Tums®, Riopan®, titralac , Gaviscon).  · Hemorrhoids (Preparation H®, anusol, tucks®, Witch hazel).  · Nausea and vomiting (Vitamin B6 100mg tablets, emetrol if you are not diabetic,  Emetrex, sea bands).  · Rashes (hydrocortisone cream or ointment, caladryl lotion or cream, benadryl cream or capsules, oatmeal bath).  · Yeast infection of the vagina (Monistat® cream or tablets, Terazol® cream).  FOR MORE INFORMATION  For more information regarding the medication you are taking and how it affects your pregnancy, go to [employer] Drug Reference link: http://www.drugs.com/drug_information.html  *Some information based on studies from The Food and Drug Administration (FDA).  Document Released: 12/18/2006 Document Revised: 03/11/2013 Document Reviewed: 09/01/2010  [employer]® Patient Information ©2014 [employer].

## 2021-02-27 NOTE — TELEPHONE ENCOUNTER
2/26/2021 @ 5:12PM    Spoke with the patient regarding the results of her quantitative hCG.  Informed the patient her quantitative hCG was indeed elevated at 1663.0.  Advised the patient the approximate gestational age is 2-3 weeks based on the level of beta hCG.  Advised patient to follow-up with her OB as scheduled.  Recommend the patient return to clinic for any worsening or concerning symptoms.  The patient had no further questions at this time.

## 2021-02-28 ENCOUNTER — OFFICE VISIT (OUTPATIENT)
Dept: URGENT CARE | Facility: PHYSICIAN GROUP | Age: 36
End: 2021-02-28
Payer: COMMERCIAL

## 2021-02-28 ENCOUNTER — HOSPITAL ENCOUNTER (OUTPATIENT)
Facility: MEDICAL CENTER | Age: 36
End: 2021-02-28
Attending: NURSE PRACTITIONER
Payer: COMMERCIAL

## 2021-02-28 VITALS
OXYGEN SATURATION: 97 % | RESPIRATION RATE: 16 BRPM | SYSTOLIC BLOOD PRESSURE: 120 MMHG | BODY MASS INDEX: 36.7 KG/M2 | WEIGHT: 215 LBS | DIASTOLIC BLOOD PRESSURE: 72 MMHG | HEART RATE: 85 BPM | HEIGHT: 64 IN | TEMPERATURE: 97.7 F

## 2021-02-28 DIAGNOSIS — Z34.90 PREGNANCY, UNSPECIFIED GESTATIONAL AGE: ICD-10-CM

## 2021-02-28 DIAGNOSIS — Z11.3 SCREEN FOR STD (SEXUALLY TRANSMITTED DISEASE): ICD-10-CM

## 2021-02-28 DIAGNOSIS — R31.9 HEMATURIA, UNSPECIFIED TYPE: ICD-10-CM

## 2021-02-28 DIAGNOSIS — R30.0 DYSURIA: ICD-10-CM

## 2021-02-28 DIAGNOSIS — N76.0 ACUTE VAGINITIS: ICD-10-CM

## 2021-02-28 LAB
APPEARANCE UR: NORMAL
BACTERIA UR CULT: NORMAL
BILIRUB UR STRIP-MCNC: NEGATIVE MG/DL
CANDIDA DNA VAG QL PROBE+SIG AMP: NEGATIVE
COLOR UR AUTO: YELLOW
G VAGINALIS DNA VAG QL PROBE+SIG AMP: POSITIVE
GLUCOSE UR STRIP.AUTO-MCNC: NEGATIVE MG/DL
KETONES UR STRIP.AUTO-MCNC: NEGATIVE MG/DL
LEUKOCYTE ESTERASE UR QL STRIP.AUTO: NEGATIVE
NITRITE UR QL STRIP.AUTO: NEGATIVE
PH UR STRIP.AUTO: 7 [PH] (ref 5–8)
PROT UR QL STRIP: NEGATIVE MG/DL
RBC UR QL AUTO: NORMAL
SIGNIFICANT IND 70042: NORMAL
SITE SITE: NORMAL
SOURCE SOURCE: NORMAL
SP GR UR STRIP.AUTO: 1.02
T VAGINALIS DNA VAG QL PROBE+SIG AMP: NEGATIVE
UROBILINOGEN UR STRIP-MCNC: 0.2 MG/DL

## 2021-02-28 PROCEDURE — 87591 N.GONORRHOEAE DNA AMP PROB: CPT

## 2021-02-28 PROCEDURE — 87086 URINE CULTURE/COLONY COUNT: CPT

## 2021-02-28 PROCEDURE — 81002 URINALYSIS NONAUTO W/O SCOPE: CPT | Performed by: NURSE PRACTITIONER

## 2021-02-28 PROCEDURE — 87480 CANDIDA DNA DIR PROBE: CPT

## 2021-02-28 PROCEDURE — 87660 TRICHOMONAS VAGIN DIR PROBE: CPT

## 2021-02-28 PROCEDURE — 99214 OFFICE O/P EST MOD 30 MIN: CPT | Performed by: NURSE PRACTITIONER

## 2021-02-28 PROCEDURE — 87491 CHLMYD TRACH DNA AMP PROBE: CPT

## 2021-02-28 PROCEDURE — 87510 GARDNER VAG DNA DIR PROBE: CPT

## 2021-02-28 ASSESSMENT — ENCOUNTER SYMPTOMS
ABDOMINAL PAIN: 0
CONSTITUTIONAL NEGATIVE: 1
VAGINITIS: 1
FEVER: 0

## 2021-02-28 ASSESSMENT — FIBROSIS 4 INDEX: FIB4 SCORE: 0.29

## 2021-02-28 ASSESSMENT — VISUAL ACUITY: OU: 1

## 2021-03-01 LAB
C TRACH DNA SPEC QL NAA+PROBE: NEGATIVE
N GONORRHOEA DNA SPEC QL NAA+PROBE: NEGATIVE
SPECIMEN SOURCE: NORMAL

## 2021-03-02 LAB
BACTERIA UR CULT: NORMAL
SIGNIFICANT IND 70042: NORMAL
SITE SITE: NORMAL
SOURCE SOURCE: NORMAL

## 2021-03-29 ENCOUNTER — HOSPITAL ENCOUNTER (OUTPATIENT)
Facility: MEDICAL CENTER | Age: 36
End: 2021-03-29
Payer: COMMERCIAL

## 2021-03-29 LAB
ABO GROUP BLD: NORMAL
RH BLD: NORMAL

## 2021-03-29 PROCEDURE — 86900 BLOOD TYPING SEROLOGIC ABO: CPT

## 2021-03-29 PROCEDURE — 86901 BLOOD TYPING SEROLOGIC RH(D): CPT

## 2021-04-02 ENCOUNTER — PRE-ADMISSION TESTING (OUTPATIENT)
Dept: ADMISSIONS | Facility: MEDICAL CENTER | Age: 36
End: 2021-04-02
Attending: OPTOMETRIST
Payer: COMMERCIAL

## 2021-04-02 DIAGNOSIS — Z01.812 PRE-OPERATIVE LABORATORY EXAMINATION: ICD-10-CM

## 2021-04-02 LAB
ABO GROUP BLD: NORMAL
ALBUMIN SERPL BCP-MCNC: 4 G/DL (ref 3.2–4.9)
ALBUMIN/GLOB SERPL: 1.3 G/DL
ALP SERPL-CCNC: 39 U/L (ref 30–99)
ALT SERPL-CCNC: 15 U/L (ref 2–50)
ANION GAP SERPL CALC-SCNC: 8 MMOL/L (ref 7–16)
APPEARANCE UR: CLEAR
AST SERPL-CCNC: 9 U/L (ref 12–45)
B-HCG SERPL-ACNC: ABNORMAL MIU/ML (ref 0–5)
BASOPHILS # BLD AUTO: 0.3 % (ref 0–1.8)
BASOPHILS # BLD: 0.03 K/UL (ref 0–0.12)
BILIRUB SERPL-MCNC: 0.3 MG/DL (ref 0.1–1.5)
BILIRUB UR QL STRIP.AUTO: NEGATIVE
BLD GP AB SCN SERPL QL: NORMAL
BUN SERPL-MCNC: 10 MG/DL (ref 8–22)
CALCIUM SERPL-MCNC: 9.1 MG/DL (ref 8.5–10.5)
CHLORIDE SERPL-SCNC: 105 MMOL/L (ref 96–112)
CO2 SERPL-SCNC: 25 MMOL/L (ref 20–33)
COLOR UR: YELLOW
CREAT SERPL-MCNC: 0.74 MG/DL (ref 0.5–1.4)
EOSINOPHIL # BLD AUTO: 0.07 K/UL (ref 0–0.51)
EOSINOPHIL NFR BLD: 0.7 % (ref 0–6.9)
ERYTHROCYTE [DISTWIDTH] IN BLOOD BY AUTOMATED COUNT: 44.2 FL (ref 35.9–50)
GLOBULIN SER CALC-MCNC: 3 G/DL (ref 1.9–3.5)
GLUCOSE SERPL-MCNC: 95 MG/DL (ref 65–99)
GLUCOSE UR STRIP.AUTO-MCNC: NEGATIVE MG/DL
HCT VFR BLD AUTO: 42.6 % (ref 37–47)
HGB BLD-MCNC: 14.3 G/DL (ref 12–16)
IMM GRANULOCYTES # BLD AUTO: 0.05 K/UL (ref 0–0.11)
IMM GRANULOCYTES NFR BLD AUTO: 0.5 % (ref 0–0.9)
KETONES UR STRIP.AUTO-MCNC: NEGATIVE MG/DL
LEUKOCYTE ESTERASE UR QL STRIP.AUTO: NEGATIVE
LYMPHOCYTES # BLD AUTO: 2.85 K/UL (ref 1–4.8)
LYMPHOCYTES NFR BLD: 28 % (ref 22–41)
MCH RBC QN AUTO: 30.7 PG (ref 27–33)
MCHC RBC AUTO-ENTMCNC: 33.6 G/DL (ref 33.6–35)
MCV RBC AUTO: 91.4 FL (ref 81.4–97.8)
MICRO URNS: NORMAL
MONOCYTES # BLD AUTO: 0.69 K/UL (ref 0–0.85)
MONOCYTES NFR BLD AUTO: 6.8 % (ref 0–13.4)
NEUTROPHILS # BLD AUTO: 6.48 K/UL (ref 2–7.15)
NEUTROPHILS NFR BLD: 63.7 % (ref 44–72)
NITRITE UR QL STRIP.AUTO: NEGATIVE
NRBC # BLD AUTO: 0 K/UL
NRBC BLD-RTO: 0 /100 WBC
PH UR STRIP.AUTO: 7 [PH] (ref 5–8)
PLATELET # BLD AUTO: 330 K/UL (ref 164–446)
PMV BLD AUTO: 9.1 FL (ref 9–12.9)
POTASSIUM SERPL-SCNC: 4 MMOL/L (ref 3.6–5.5)
PROT SERPL-MCNC: 7 G/DL (ref 6–8.2)
PROT UR QL STRIP: NEGATIVE MG/DL
RBC # BLD AUTO: 4.66 M/UL (ref 4.2–5.4)
RBC UR QL AUTO: NEGATIVE
RH BLD: NORMAL
SARS-COV+SARS-COV-2 AG RESP QL IA.RAPID: NOTDETECTED
SODIUM SERPL-SCNC: 138 MMOL/L (ref 135–145)
SP GR UR STRIP.AUTO: 1.01
SPECIMEN SOURCE: NORMAL
UROBILINOGEN UR STRIP.AUTO-MCNC: 0.2 MG/DL
WBC # BLD AUTO: 10.2 K/UL (ref 4.8–10.8)

## 2021-04-02 PROCEDURE — 81003 URINALYSIS AUTO W/O SCOPE: CPT

## 2021-04-02 PROCEDURE — 36415 COLL VENOUS BLD VENIPUNCTURE: CPT

## 2021-04-02 PROCEDURE — 87426 SARSCOV CORONAVIRUS AG IA: CPT

## 2021-04-02 PROCEDURE — 86850 RBC ANTIBODY SCREEN: CPT

## 2021-04-02 PROCEDURE — 85025 COMPLETE CBC W/AUTO DIFF WBC: CPT

## 2021-04-02 PROCEDURE — 80053 COMPREHEN METABOLIC PANEL: CPT

## 2021-04-02 PROCEDURE — 84702 CHORIONIC GONADOTROPIN TEST: CPT

## 2021-04-02 PROCEDURE — 86901 BLOOD TYPING SEROLOGIC RH(D): CPT

## 2021-04-02 PROCEDURE — 86900 BLOOD TYPING SEROLOGIC ABO: CPT

## 2021-04-03 ENCOUNTER — HOSPITAL ENCOUNTER (OUTPATIENT)
Facility: MEDICAL CENTER | Age: 36
End: 2021-04-03
Attending: OBSTETRICS & GYNECOLOGY | Admitting: OBSTETRICS & GYNECOLOGY
Payer: COMMERCIAL

## 2021-04-03 ENCOUNTER — ANESTHESIA EVENT (OUTPATIENT)
Dept: OBGYN | Facility: MEDICAL CENTER | Age: 36
End: 2021-04-03
Payer: COMMERCIAL

## 2021-04-03 ENCOUNTER — ANESTHESIA (OUTPATIENT)
Dept: OBGYN | Facility: MEDICAL CENTER | Age: 36
End: 2021-04-03
Payer: COMMERCIAL

## 2021-04-03 VITALS
DIASTOLIC BLOOD PRESSURE: 68 MMHG | RESPIRATION RATE: 16 BRPM | BODY MASS INDEX: 38.93 KG/M2 | HEART RATE: 74 BPM | TEMPERATURE: 97.7 F | WEIGHT: 228 LBS | SYSTOLIC BLOOD PRESSURE: 112 MMHG | OXYGEN SATURATION: 99 % | HEIGHT: 64 IN

## 2021-04-03 LAB — HOLDING TUBE BB 8507: NORMAL

## 2021-04-03 PROCEDURE — 700111 HCHG RX REV CODE 636 W/ 250 OVERRIDE (IP): Performed by: ANESTHESIOLOGY

## 2021-04-03 PROCEDURE — 700101 HCHG RX REV CODE 250: Performed by: ANESTHESIOLOGY

## 2021-04-03 PROCEDURE — 160035 HCHG PACU - 1ST 60 MINS PHASE I: Performed by: OBSTETRICS & GYNECOLOGY

## 2021-04-03 PROCEDURE — 36415 COLL VENOUS BLD VENIPUNCTURE: CPT

## 2021-04-03 PROCEDURE — 160009 HCHG ANES TIME/MIN: Performed by: OBSTETRICS & GYNECOLOGY

## 2021-04-03 PROCEDURE — 700105 HCHG RX REV CODE 258: Performed by: ANESTHESIOLOGY

## 2021-04-03 PROCEDURE — 160029 HCHG SURGERY MINUTES - 1ST 30 MINS LEVEL 4: Performed by: OBSTETRICS & GYNECOLOGY

## 2021-04-03 PROCEDURE — 88305 TISSUE EXAM BY PATHOLOGIST: CPT

## 2021-04-03 PROCEDURE — 160041 HCHG SURGERY MINUTES - EA ADDL 1 MIN LEVEL 4: Performed by: OBSTETRICS & GYNECOLOGY

## 2021-04-03 PROCEDURE — 160002 HCHG RECOVERY MINUTES (STAT): Performed by: OBSTETRICS & GYNECOLOGY

## 2021-04-03 PROCEDURE — 160048 HCHG OR STATISTICAL LEVEL 1-5: Performed by: OBSTETRICS & GYNECOLOGY

## 2021-04-03 RX ORDER — HALOPERIDOL 5 MG/ML
1 INJECTION INTRAMUSCULAR
Status: DISCONTINUED | OUTPATIENT
Start: 2021-04-03 | End: 2021-04-03 | Stop reason: HOSPADM

## 2021-04-03 RX ORDER — KETOROLAC TROMETHAMINE 30 MG/ML
INJECTION, SOLUTION INTRAMUSCULAR; INTRAVENOUS PRN
Status: DISCONTINUED | OUTPATIENT
Start: 2021-04-03 | End: 2021-04-03 | Stop reason: SURG

## 2021-04-03 RX ORDER — ONDANSETRON 2 MG/ML
4 INJECTION INTRAMUSCULAR; INTRAVENOUS
Status: DISCONTINUED | OUTPATIENT
Start: 2021-04-03 | End: 2021-04-03 | Stop reason: HOSPADM

## 2021-04-03 RX ORDER — HYDROMORPHONE HYDROCHLORIDE 1 MG/ML
0.2 INJECTION, SOLUTION INTRAMUSCULAR; INTRAVENOUS; SUBCUTANEOUS
Status: DISCONTINUED | OUTPATIENT
Start: 2021-04-03 | End: 2021-04-03 | Stop reason: HOSPADM

## 2021-04-03 RX ORDER — HYDROMORPHONE HYDROCHLORIDE 1 MG/ML
0.4 INJECTION, SOLUTION INTRAMUSCULAR; INTRAVENOUS; SUBCUTANEOUS
Status: DISCONTINUED | OUTPATIENT
Start: 2021-04-03 | End: 2021-04-03 | Stop reason: HOSPADM

## 2021-04-03 RX ORDER — CEFAZOLIN SODIUM 1 G/3ML
INJECTION, POWDER, FOR SOLUTION INTRAMUSCULAR; INTRAVENOUS PRN
Status: DISCONTINUED | OUTPATIENT
Start: 2021-04-03 | End: 2021-04-03 | Stop reason: SURG

## 2021-04-03 RX ORDER — IBUPROFEN 600 MG/1
600 TABLET ORAL EVERY 6 HOURS PRN
Qty: 30 TABLET | Refills: 1 | Status: SHIPPED | OUTPATIENT
Start: 2021-04-03 | End: 2021-07-22

## 2021-04-03 RX ORDER — LIDOCAINE HYDROCHLORIDE 20 MG/ML
INJECTION, SOLUTION EPIDURAL; INFILTRATION; INTRACAUDAL; PERINEURAL PRN
Status: DISCONTINUED | OUTPATIENT
Start: 2021-04-03 | End: 2021-04-03 | Stop reason: SURG

## 2021-04-03 RX ORDER — DEXAMETHASONE SODIUM PHOSPHATE 4 MG/ML
INJECTION, SOLUTION INTRA-ARTICULAR; INTRALESIONAL; INTRAMUSCULAR; INTRAVENOUS; SOFT TISSUE PRN
Status: DISCONTINUED | OUTPATIENT
Start: 2021-04-03 | End: 2021-04-03 | Stop reason: SURG

## 2021-04-03 RX ORDER — OXYCODONE HCL 5 MG/5 ML
5 SOLUTION, ORAL ORAL
Status: DISCONTINUED | OUTPATIENT
Start: 2021-04-03 | End: 2021-04-03 | Stop reason: HOSPADM

## 2021-04-03 RX ORDER — DIPHENHYDRAMINE HYDROCHLORIDE 50 MG/ML
12.5 INJECTION INTRAMUSCULAR; INTRAVENOUS
Status: DISCONTINUED | OUTPATIENT
Start: 2021-04-03 | End: 2021-04-03 | Stop reason: HOSPADM

## 2021-04-03 RX ORDER — MIDAZOLAM HYDROCHLORIDE 1 MG/ML
INJECTION INTRAMUSCULAR; INTRAVENOUS PRN
Status: DISCONTINUED | OUTPATIENT
Start: 2021-04-03 | End: 2021-04-03 | Stop reason: SURG

## 2021-04-03 RX ORDER — HYDROMORPHONE HYDROCHLORIDE 1 MG/ML
0.5 INJECTION, SOLUTION INTRAMUSCULAR; INTRAVENOUS; SUBCUTANEOUS
Status: DISCONTINUED | OUTPATIENT
Start: 2021-04-03 | End: 2021-04-03 | Stop reason: HOSPADM

## 2021-04-03 RX ORDER — ONDANSETRON 2 MG/ML
INJECTION INTRAMUSCULAR; INTRAVENOUS PRN
Status: DISCONTINUED | OUTPATIENT
Start: 2021-04-03 | End: 2021-04-03 | Stop reason: SURG

## 2021-04-03 RX ORDER — OXYCODONE HCL 5 MG/5 ML
10 SOLUTION, ORAL ORAL
Status: DISCONTINUED | OUTPATIENT
Start: 2021-04-03 | End: 2021-04-03 | Stop reason: HOSPADM

## 2021-04-03 RX ORDER — SODIUM CHLORIDE, SODIUM GLUCONATE, SODIUM ACETATE, POTASSIUM CHLORIDE AND MAGNESIUM CHLORIDE 526; 502; 368; 37; 30 MG/100ML; MG/100ML; MG/100ML; MG/100ML; MG/100ML
1500 INJECTION, SOLUTION INTRAVENOUS ONCE
Status: COMPLETED | OUTPATIENT
Start: 2021-04-03 | End: 2021-04-03

## 2021-04-03 RX ORDER — SODIUM CHLORIDE, SODIUM LACTATE, POTASSIUM CHLORIDE, CALCIUM CHLORIDE 600; 310; 30; 20 MG/100ML; MG/100ML; MG/100ML; MG/100ML
INJECTION, SOLUTION INTRAVENOUS CONTINUOUS
Status: DISCONTINUED | OUTPATIENT
Start: 2021-04-03 | End: 2021-04-03 | Stop reason: HOSPADM

## 2021-04-03 RX ORDER — MEPERIDINE HYDROCHLORIDE 25 MG/ML
12.5 INJECTION INTRAMUSCULAR; INTRAVENOUS; SUBCUTANEOUS
Status: DISCONTINUED | OUTPATIENT
Start: 2021-04-03 | End: 2021-04-03 | Stop reason: HOSPADM

## 2021-04-03 RX ADMIN — DEXAMETHASONE SODIUM PHOSPHATE 8 MG: 4 INJECTION, SOLUTION INTRA-ARTICULAR; INTRALESIONAL; INTRAMUSCULAR; INTRAVENOUS; SOFT TISSUE at 09:56

## 2021-04-03 RX ADMIN — SODIUM CHLORIDE, SODIUM GLUCONATE, SODIUM ACETATE, POTASSIUM CHLORIDE AND MAGNESIUM CHLORIDE: 526; 502; 368; 37; 30 INJECTION, SOLUTION INTRAVENOUS at 09:48

## 2021-04-03 RX ADMIN — PROPOFOL 200 MG: 10 INJECTION, EMULSION INTRAVENOUS at 09:53

## 2021-04-03 RX ADMIN — ONDANSETRON 4 MG: 2 INJECTION INTRAMUSCULAR; INTRAVENOUS at 10:10

## 2021-04-03 RX ADMIN — KETOROLAC TROMETHAMINE 30 MG: 30 INJECTION, SOLUTION INTRAMUSCULAR at 10:10

## 2021-04-03 RX ADMIN — FENTANYL CITRATE 100 MCG: 50 INJECTION, SOLUTION INTRAMUSCULAR; INTRAVENOUS at 09:53

## 2021-04-03 RX ADMIN — CEFAZOLIN 2 G: 330 INJECTION, POWDER, FOR SOLUTION INTRAMUSCULAR; INTRAVENOUS at 09:48

## 2021-04-03 RX ADMIN — MIDAZOLAM HYDROCHLORIDE 2 MG: 1 INJECTION, SOLUTION INTRAMUSCULAR; INTRAVENOUS at 09:48

## 2021-04-03 RX ADMIN — LIDOCAINE HYDROCHLORIDE 100 MG: 20 INJECTION, SOLUTION EPIDURAL; INFILTRATION; INTRACAUDAL at 09:53

## 2021-04-03 RX ADMIN — FENTANYL CITRATE 100 MCG: 50 INJECTION, SOLUTION INTRAMUSCULAR; INTRAVENOUS at 10:07

## 2021-04-03 ASSESSMENT — LIFESTYLE VARIABLES: EVER_SMOKED: NEVER

## 2021-04-03 ASSESSMENT — PAIN SCALES - GENERAL: PAIN_LEVEL: 0

## 2021-04-03 ASSESSMENT — FIBROSIS 4 INDEX: FIB4 SCORE: 0.25

## 2021-04-03 NOTE — PROGRESS NOTES
Pt presents to unit for scheduled D&E. Pt to [facility] 6, instructed to leave UA sample, change and call when ready. Pt of Dr. Mcdonald, , GA 8. Pt NPO since  on 4/3. LISA Pedroza at bedside. VSS.    0948- pt in OR 3  1004- procedure start  1010- procedure end  1022- in recovery  1100- SO at bedside. Pt denies pain, n/v or dizziness. States dry mouth, ice chips provided.   1244- d/c education reviewed with pt and SO, [employer] flyers given. Pt ambulated to bathroom with no assistance and steady gait. Void without difficulty. Pt denies pain, n/v at this time. PO in take tolerated. Pt left in care of LISA Pedroza with all her personal possession.

## 2021-04-03 NOTE — H&P
DATE OF ADMISSION:  2021     HISTORY OF PRESENT ILLNESS:  This is a 36-year-old female  1, para 0   who presented to my office two weeks ago with a 7-week pregnancy and then   represented this last week to undergo a repeat ultrasound and was diagnosed   with an 8 weeks and 4 days fetal demise.  She presents to the hospital today   to undergo a first trimester D and C.     PAST MEDICAL HISTORY:  Includes asthma and PCOS.     PAST SURGICAL HISTORY:  Negative.     SOCIAL HISTORY:  Negative.     LABORATORY DATA:  Her labs with this pregnancy only show that she is Rh   positive.     PHYSICAL EXAMINATION:  VITAL SIGNS:  Stable on admission.  CARDIOVASCULAR:  Regular rate and rhythm.  LUNGS:  Clear.  PELVIC:  Deferred.  EXTREMITIES:  Show no clubbing, cyanosis or edema.     ASSESSMENT AND PLAN:  This is a 36-year-old female with an 8 weeks and 4 days   intrauterine gestational demise.  Plan is to proceed with a D and C.  I have   discussed with her risks of the procedure to include pain, bleeding,   infection, possible uterine perforation, risks of HIV and hepatitis in the   event that a transfusion is necessary.  She understands all of the above risk   factors and her questions were answered in the office and she has signed all   of the proper consent forms.        ______________________________  Corinne E. Capurro, MD CEC/SENG    DD:  2021 09:33  DT:  2021 10:56    Job#:  [id number]

## 2021-04-03 NOTE — ANESTHESIA POSTPROCEDURE EVALUATION
Patient: Fatemeh De La O    Procedure Summary     Date: 21 Room / Location: [facility] OR 01 / SURGERY LABOR AND DELIVERY    Anesthesia Start: 948 Anesthesia Stop:     Procedure: DILATION AND EVACUATION, UTERUS (N/A Vagina ) Diagnosis:       S/P D&C (status post dilation and curettage)      (MISSED )    Surgeons: Corinne E Capurro, M.D. Responsible Provider: Tad Coronel M.D.    Anesthesia Type: general ASA Status: 2          Final Anesthesia Type: general  Last vitals  BP   Blood Pressure: 115/57    Temp   36.5 °C (97.7 °F)    Pulse   70   Resp   16    SpO2   97 %      Anesthesia Post Evaluation    Patient location during evaluation: PACU  Patient participation: complete - patient participated  Level of consciousness: awake and alert  Pain score: 0    Airway patency: patent  Anesthetic complications: no  Cardiovascular status: hemodynamically stable  Respiratory status: acceptable  Hydration status: euvolemic    PONV: none          No complications documented.     Nurse Pain Score: 0 (NPRS)

## 2021-04-03 NOTE — OP REPORT
DATE OF SERVICE:  04/03/2021     PREOPERATIVE DIAGNOSIS:  Intrauterine fetal demise at 8-4/7 weeks.     POSTOPERATIVE DIAGNOSIS:  Intrauterine fetal demise at 8-4/7 weeks.     PROCEDURE:  First trimester D and C.     PRIMARY SURGEON:  Corinne E. Capurro, MD     ANESTHESIA:  General endotracheal anesthesia.     COMPLICATIONS:  None.     TOTAL ESTIMATED BLOOD LOSS:  Less than 50 mL     PATHOLOGY:  Products of conception.     FINDINGS:  Tissue consistent with products of conception.     DESCRIPTION OF PROCEDURE:  After the patient was taken to the operating room   and her general anesthesia was found to be adequate, she was then prepped and   draped in the usual dorsal supine position using Lemuel stirrups.  A bivalve   speculum was placed into the patient's vagina.  The anterior lip of the cervix   was grasped with a single tooth tenaculum.  The cervix was serially dilated   to a size 8 using Hegar dilators.  A size 8 curved curette was then introduced   into the uterus as a means to perform curettage.  The suction curette was   then removed and sharp curettage was performed until uterine cry noted in all   4 quadrants.  Suction curette was then introduced one last time to remove any   remaining products of conception.  All instruments were then removed from the   patient's vagina.  Single-tooth tenaculum removed from the cervix and noted to   be hemostatic.  The patient returned to recovery in stable condition.    Sponge, lap and needle counts were correct x3 at the end of the procedure.        ______________________________  Corinne E. Capurro, MD CEC/SENG    DD:  04/03/2021 10:18  DT:  04/03/2021 11:01    Job#:  [id number]

## 2021-04-03 NOTE — DISCHARGE INSTRUCTIONS
Dilation and Curettage or Vacuum Curettage, Care After  This sheet gives you information about how to care for yourself after your procedure. Your health care provider may also give you more specific instructions. If you have problems or questions, contact your health care provider.  What can I expect after the procedure?  After your procedure, it is common to have:  · Mild pain or cramping.  · Some vaginal bleeding or spotting.  These may last for up to 2 weeks after your procedure.  Follow these instructions at home:  Activity    · Do not drive or use heavy machinery while taking prescription pain medicine.  · Avoid driving for the first 24 hours after your procedure.  · Take frequent, short walks, followed by rest periods, throughout the day. Ask your health care provider what activities are safe for you. After 1-2 days, you may be able to return to your normal activities.  · Do not lift anything heavier than 10 lb (4.5 kg) until your health care provider approves.  · For at least 2 weeks, or as long as told by your health care provider, do not:  ? Douche.  ? Use tampons.  ? Have sexual intercourse.  General instructions    · Take over-the-counter and prescription medicines only as told by your health care provider. This is especially important if you take blood thinning medicine.  · Do not take baths, swim, or use a hot tub until your health care provider approves. Take showers instead of baths.  · Wear compression stockings as told by your health care provider. These stockings help to prevent blood clots and reduce swelling in your legs.  · It is your responsibility to get the results of your procedure. Ask your health care provider, or the department performing the procedure, when your results will be ready.  · Keep all follow-up visits as told by your health care provider. This is important.  Contact a health care provider if:  · You have severe cramps that get worse or that do not get better with  medicine.  · You have severe abdominal pain.  · You cannot drink fluids without vomiting.  · You develop pain in a different area of your pelvis.  · You have bad-smelling vaginal discharge.  · You have a rash.  Get help right away if:  · You have vaginal bleeding that soaks more than one sanitary pad in 1 hour, for 2 hours in a row.  · You pass large blood clots from your vagina.  · You have a fever that is above 100.4°F (38.0°C).  · Your abdomen feels very tender or hard.  · You have chest pain.  · You have shortness of breath.  · You cough up blood.  · You feel dizzy or light-headed.  · You faint.  · You have pain in your neck or shoulder area.  This information is not intended to replace advice given to you by your health care provider. Make sure you discuss any questions you have with your health care provider.  Document Released: 12/15/2001 Document Revised: 11/30/2018 Document Reviewed: 07/20/2017  [employer] Patient Education © 2020 [employer]      Dilation and Curettage or Vacuum Curettage    Dilation and curettage (D&C) and vacuum curettage are minor procedures. A D&C involves stretching (dilation) the cervix and scraping (curettage) the inside lining of the uterus (endometrium). During a D&C, tissue is gently scraped from the endometrium, starting from the top portion of the uterus down to the lowest part of the uterus (cervix). During a vacuum curettage, the lining and tissue in the uterus are removed with the use of gentle suction.  Curettage may be performed to either diagnose or treat a problem. As a diagnostic procedure, curettage is performed to examine tissues from the uterus. A diagnostic curettage may be done if you have:  · Irregular bleeding in the uterus.  · Bleeding with the development of clots.  · Spotting between menstrual periods.  · Prolonged menstrual periods or other abnormal bleeding.  · Bleeding after menopause.  · No menstrual period (amenorrhea).  · A change in size and shape of  the uterus.  · Abnormal endometrial cells discovered during a Pap test.  As a treatment procedure, curettage may be performed for the following reasons:  · Removal of an IUD (intrauterine device).  · Removal of retained placenta after giving birth.  · .  · Miscarriage.  · Removal of endometrial polyps.  · Removal of uncommon types of noncancerous lumps (fibroids).  Tell a health care provider about:  · Any allergies you have, including allergies to prescribed medicine or latex.  · All medicines you are taking, including vitamins, herbs, eye drops, creams, and over-the-counter medicines. This is especially important if you take any blood-thinning medicine. Bring a list of all of your medicines to your appointment.  · Any problems you or family members have had with anesthetic medicines.  · Any blood disorders you have.  · Any surgeries you have had.  · Your medical history and any medical conditions you have.  · Whether you are pregnant or may be pregnant.  · Recent vaginal infections you have had.  · Recent menstrual periods, bleeding problems you have had, and what form of birth control (contraception) you use.  What are the risks?  Generally, this is a safe procedure. However, problems may occur, including:  · Infection.  · Heavy vaginal bleeding.  · Allergic reactions to medicines.  · Damage to the cervix or other structures or organs.  · Development of scar tissue (adhesions) inside the uterus, which can cause abnormal amounts of menstrual bleeding. This may make it harder to get pregnant in the future.  · A hole (perforation) or puncture in the uterine wall. This is rare.  What happens before the procedure?  Staying hydrated  Follow instructions from your health care provider about hydration, which may include:  · Up to 2 hours before the procedure - you may continue to drink clear liquids, such as water, clear fruit juice, black coffee, and plain tea.  Eating and drinking restrictions  Follow  instructions from your health care provider about eating and drinking, which may include:  · 8 hours before the procedure - stop eating heavy meals or foods such as meat, fried foods, or fatty foods.  · 6 hours before the procedure - stop eating light meals or foods, such as toast or cereal.  · 6 hours before the procedure - stop drinking milk or drinks that contain milk.  · 2 hours before the procedure - stop drinking clear liquids. If your health care provider told you to take your medicine(s) on the day of your procedure, take them with only a sip of water.  Medicines  · Ask your health care provider about:  ? Changing or stopping your regular medicines. This is especially important if you are taking diabetes medicines or blood thinners.  ? Taking medicines such as aspirin and ibuprofen. These medicines can thin your blood. Do not take these medicines before your procedure if your health care provider instructs you not to.  · You may be given antibiotic medicine to help prevent infection.  General instructions  · For 24 hours before your procedure, do not:  ? Douche.  ? Use tampons.  ? Use medicines, creams, or suppositories in the vagina.  ? Have sexual intercourse.  · You may be given a pregnancy test on the day of the procedure.  · Plan to have someone take you home from the hospital or clinic.  · You may have a blood or urine sample taken.  · If you will be going home right after the procedure, plan to have someone with you for 24 hours.  What happens during the procedure?  · To reduce your risk of infection:  ? Your health care team will wash or sanitize their hands.  ? Your skin will be washed with soap.  · An IV tube will be inserted into one of your veins.  · You will be given one of the following:  ? A medicine that numbs the area in and around the cervix (local anesthetic).  ? A medicine to make you fall asleep (general anesthetic).  · You will lie down on your back, with your feet in foot rests  (santi).  · The size and position of your uterus will be checked.  · A lubricated instrument (speculum or Moreau retractor) will be inserted into the back side of your vagina. The speculum will be used to hold apart the walls of your vagina so your health care provider can see your cervix.  · A tool (tenaculum) will be attached to the lip of the cervix to stabilize it.  · Your cervix will be softened and dilated. This may be done by:  ? Taking a medicine.  ? Having tapered dilators or thin rods (laminaria) or gradual widening instruments (tapered dilators) inserted into your cervix.  · A small, sharp, curved instrument (curette) will be used to scrape a small amount of tissue or cells from the endometrium or cervical canal. In some cases, gentle suction is applied with the curette. The curette will then be removed. The cells will be taken to a lab for testing.  The procedure may vary among health care providers and hospitals.  What happens after the procedure?  · You may have mild cramping, backache, pain, and light bleeding or spotting. You may pass small blood clots from your vagina.  · You may have to wear compression stockings. These stockings help to prevent blood clots and reduce swelling in your legs.  · Your blood pressure, heart rate, breathing rate, and blood oxygen level will be monitored until the medicines you were given have worn off.  Summary  · Dilation and curettage (D&C) involves stretching (dilation) the cervix and scraping (curettage) the inside lining of the uterus (endometrium).  · After the procedure, you may have mild cramping, backache, pain, and light bleeding or spotting. You may pass small blood clots from your vagina.  · Plan to have someone take you home from the hospital or clinic.  This information is not intended to replace advice given to you by your health care provider. Make sure you discuss any questions you have with your health care provider.  Document Released: 12/18/2006  Document Revised: 11/30/2018 Document Reviewed: 09/03/2017  [employer] Patient Education © 2020 [employer]

## 2021-04-03 NOTE — ANESTHESIA PREPROCEDURE EVALUATION
Missed  at approximately 8-9 weeks. Patient is obese with BMI 39 and has a history of smoking and mild asthma.    Relevant Problems   No relevant active problems       Physical Exam    Airway   Mallampati: II  TM distance: >3 FB  Neck ROM: full       Cardiovascular - normal exam  Rhythm: regular  Rate: normal  (-) murmur     Dental - normal exam           Pulmonary - normal exam  Breath sounds clear to auscultation     Abdominal    Neurological - normal exam                 Anesthesia Plan    ASA 2       Plan - general       Airway plan will be LMA          Induction: intravenous    Postoperative Plan: Postoperative administration of opioids is intended.    Pertinent diagnostic labs and testing reviewed    Informed Consent:    Anesthetic plan and risks discussed with patient.    Use of blood products discussed with: patient whom consented to blood products.

## 2021-04-03 NOTE — ANESTHESIA TIME REPORT
Anesthesia Start and Stop Event Times     Date Time Event    4/3/2021 0909 Ready for Procedure     0948 Anesthesia Start     1023 Anesthesia Stop        Responsible Staff  21    Name Role Begin End    Tad Coronel M.D. Anesth 0948 1023        Preop Diagnosis (Free Text):  Pre-op Diagnosis     MISSED         Preop Diagnosis (Codes):    Post op Diagnosis  Missed   Missed  at 8 weeks gestation    Premium Reason  E. Weekend    Comments:

## 2021-04-03 NOTE — ANESTHESIA PROCEDURE NOTES
Airway    Date/Time: 4/3/2021 9:54 AM  Performed by: Tad Coronel M.D.  Authorized by: Tad Coronel M.D.     Location:  OR  Urgency:  Elective  Indications for Airway Management:  Anesthesia      Spontaneous Ventilation: absent    Sedation Level:  Deep  Preoxygenated: Yes    Final Airway Type:  Supraglottic airway  Final Supraglottic Airway:  Standard LMA    SGA Size:  4  Number of Attempts at Approach:  1   Atraumatic insertion, good seal

## 2021-04-05 LAB — PATHOLOGY CONSULT NOTE: NORMAL

## 2021-04-08 ENCOUNTER — HOSPITAL ENCOUNTER (OUTPATIENT)
Facility: MEDICAL CENTER | Age: 36
End: 2021-04-08
Attending: OBSTETRICS & GYNECOLOGY | Admitting: OBSTETRICS & GYNECOLOGY
Payer: COMMERCIAL

## 2021-07-22 ENCOUNTER — HOSPITAL ENCOUNTER (OUTPATIENT)
Facility: MEDICAL CENTER | Age: 36
End: 2021-07-22
Attending: PHYSICIAN ASSISTANT
Payer: COMMERCIAL

## 2021-07-22 ENCOUNTER — OFFICE VISIT (OUTPATIENT)
Dept: URGENT CARE | Facility: PHYSICIAN GROUP | Age: 36
End: 2021-07-22
Payer: COMMERCIAL

## 2021-07-22 VITALS
TEMPERATURE: 98.4 F | OXYGEN SATURATION: 95 % | SYSTOLIC BLOOD PRESSURE: 120 MMHG | HEART RATE: 114 BPM | WEIGHT: 223 LBS | HEIGHT: 64 IN | DIASTOLIC BLOOD PRESSURE: 70 MMHG | BODY MASS INDEX: 38.07 KG/M2

## 2021-07-22 DIAGNOSIS — R05.9 COUGH: ICD-10-CM

## 2021-07-22 DIAGNOSIS — J98.01 ACUTE BRONCHOSPASM: ICD-10-CM

## 2021-07-22 DIAGNOSIS — R50.9 FEVER, UNSPECIFIED FEVER CAUSE: ICD-10-CM

## 2021-07-22 DIAGNOSIS — Z91.89 AT INCREASED RISK OF EXPOSURE TO COVID-19 VIRUS: ICD-10-CM

## 2021-07-22 DIAGNOSIS — R05.9 COUGH: Primary | ICD-10-CM

## 2021-07-22 PROBLEM — J32.4 CHRONIC PANSINUSITIS: Status: ACTIVE | Noted: 2021-07-22

## 2021-07-22 PROBLEM — R09.81 NASAL CONGESTION: Status: ACTIVE | Noted: 2021-07-22

## 2021-07-22 PROBLEM — J34.3 HYPERTROPHY OF NASAL TURBINATES: Status: ACTIVE | Noted: 2021-07-22

## 2021-07-22 PROBLEM — R09.82 POSTNASAL DRIP: Status: ACTIVE | Noted: 2021-07-22

## 2021-07-22 PROCEDURE — U0005 INFEC AGEN DETEC AMPLI PROBE: HCPCS

## 2021-07-22 PROCEDURE — 99214 OFFICE O/P EST MOD 30 MIN: CPT | Performed by: PHYSICIAN ASSISTANT

## 2021-07-22 PROCEDURE — U0003 INFECTIOUS AGENT DETECTION BY NUCLEIC ACID (DNA OR RNA); SEVERE ACUTE RESPIRATORY SYNDROME CORONAVIRUS 2 (SARS-COV-2) (CORONAVIRUS DISEASE [COVID-19]), AMPLIFIED PROBE TECHNIQUE, MAKING USE OF HIGH THROUGHPUT TECHNOLOGIES AS DESCRIBED BY CMS-2020-01-R: HCPCS

## 2021-07-22 RX ORDER — AMOXICILLIN 500 MG/1
CAPSULE ORAL
COMMUNITY
Start: 2021-06-29 | End: 2021-07-22

## 2021-07-22 RX ORDER — ALBUTEROL SULFATE 90 UG/1
2 AEROSOL, METERED RESPIRATORY (INHALATION) EVERY 4 HOURS PRN
Qty: 1 EACH | Refills: 1 | Status: ON HOLD | OUTPATIENT
Start: 2021-07-22 | End: 2023-07-17

## 2021-07-22 ASSESSMENT — ENCOUNTER SYMPTOMS
MYALGIAS: 1
WHEEZING: 1
FEVER: 1
SORE THROAT: 0
RHINORRHEA: 0
HEADACHES: 1
CHILLS: 1
COUGH: 1

## 2021-07-22 ASSESSMENT — FIBROSIS 4 INDEX: FIB4 SCORE: 0.25

## 2021-07-22 NOTE — PATIENT INSTRUCTIONS
INSTRUCTIONS FOR COVID-19 OR ANY OTHER INFECTIOUS RESPIRATORY ILLNESSES    The Centers for Disease Control and Prevention (CDC) states that early indications for COVID-19 include cough, shortness of breath, difficulty breathing, or at least two of the following symptoms: chills, shaking with chills, muscle pain, headache, sore throat, and loss of taste or smell. Symptoms can range from mild to severe and may appear up to two weeks after exposure to the virus.    The practice of self-isolation and quarantine helps protect the public and your family by  preventing exposure to people who have or may have a contagious disease. Please follow the prevention steps below as based on CDC guidelines:    WHEN TO STOP ISOLATION: Persons with COVID-19 or any other infectious respiratory illness who have symptoms and were advised to care for themselves at home may discontinue home isolation under the following conditions:  · At least 24 hours have passed since recovery defined as resolution of fever without the use of fever-reducing medications; AND,  · Improvement in respiratory symptoms (e.g., cough, shortness of breath); AND,  · At least 10 days have passed since symptoms first appeared and have had no subsequent illness.    MONITOR YOUR SYMPTOMS: If your illness is worsening, seek prompt medical attention. If you have a medical emergency and need to call 911, notify the dispatch personnel that you have, or are being evaluated for confirmed or suspected COVID-19 or another infectious respiratory illness. Wear a facemask if possible.    ACTIVITY RESTRICTION: restrict activities outside your home, except for getting medical care. Do not go to work, school, or public areas. Avoid using public transportation, ride-sharing, or taxis.    SCHEDULED MEDICAL APPOINTMENTS: Notify your provider that you have, or are being evaluated for, confirmed or suspected COVID-19 or another infectious respiratory. This will help the healthcare  provider’s office safely take care of you and keep other people from getting exposed or infected.    FACEMASKS, when to wear: Anytime you are away from your home or around other people or pets. If you are unable to wear one, maintain a minimum of 6 feet distancing from others.    LIVING ENVIRONMENT: Stay in a separate room from other people and pets. If possible, use a separate bathroom, have someone else care for your pets and avoid sharing household items. Any items used should be washed thoroughly with soap and water. Clean all “high-touch” surfaces every day. Use a household cleaning spray or wipe, according to the label instructions. High touch surfaces include (but are not limited to) counters, tabletops, doorknobs, bathroom fixtures, toilets, phones, keyboards, tablets, and bedside tables.     HAND WASHING: Frequently wash hands with soap and water for at least 20 seconds,  especially after blowing your nose, coughing, or sneezing; going to the bathroom; before and after interacting with pets; and before and after eating or preparing food. If hands are visibly dirty use soap and water. If soap and water are not available, use an alcohol-based hand  with at least 60% alcohol. Avoid touching your eyes, nose, and mouth with unwashed hands. Cover your coughs and sneezes with a tissue. Throw used tissues in a lined trash can. Immediately wash your hands.    ACTIVE/FACILITATED SELF-MONITORING: Follow instructions provided by your local health department or health professionals, as appropriate. When working with your local health department check their available hours.    [facility]   Phone Number   [facility] [phone]   [facility] [place] [phone]   [place] [address]   [place] [phone]     IF YOU HAVE CONFIRMED POSITIVE COVID-19:    Those who have completely recovered from COVID-19 may have immune-boosting antibodies in their plasma--called “convalescent plasma”--that could be  used to treat critically ill COVID19 patients.    [facility] is excited to begin working with Jessica on collecting convalescent plasma from  people who have recovered from COVID-19 as part of a program to treat patients infected with the virus. This FDA-approved “emergency investigational new drug” is a special blood product containing antibodies that may give patients an extra boost to fight the virus.    To be eligible to donate convalescent plasma, you must have a prior COVID-19 diagnosis documented by a laboratory test (or a positive test result for SARS-CoV-2 antibodies) and meet additional eligibility requirements.    If you are interested in donating convalescent plasma or have any additional questions, please contact the [facility] Convalescent Plasma  at [phone] or via e-mail at [email or web address].

## 2021-07-22 NOTE — PROGRESS NOTES
Subjective:      Fatemeh De La O is a 36 y.o. female who presents with Illness (started this morning, weakness, fever, cough. pt is concerned for covid )    Medications:    • albuterol Aers  • amoxicillin Caps  • ibuprofen Tabs  • PRENATAL VITAMINS PO    Allergies: Patient has no known allergies.    Problem List: Fatemeh De La O does not have any pertinent problems on file.    Surgical History:  Past Surgical History:   Procedure Laterality Date   • DILATION AND EVACUATION N/A 4/3/2021    Procedure: DILATION AND EVACUATION, UTERUS;  Surgeon: Corinne E Capurro, M.D.;  Location: SURGERY LABOR AND DELIVERY;  Service: Gynecology   • DENTAL EXTRACTION(S)         Past Social Hx: Fatemeh De La O  reports that she has been smoking cigarettes. She has a 3.00 pack-year smoking history. She has never used smokeless tobacco. She reports current alcohol use of about 1.2 - 1.8 oz of alcohol per week. She reports that she does not use drugs.     Past Family Hx:  Fatemeh De La O family history includes Diabetes in her father and maternal grandfather; Hyperlipidemia in her maternal grandmother; Hypertension in her maternal grandmother; Lung Disease in her maternal grandfather.     Problem list, medications, and allergies reviewed by myself today in Epic.          Patient presents with:  Illness: started this morning, weakness, fever, cough  4 days. pt is concerned for covid since she has not yet had her COVID vaccine.  PT has recently been caring for her grandfather who is in the ICU for PNA (aspiration) but would like a covid test.  PT does work outside of the home but wears a mask when she is away from her house.    URI   This is a new problem. The current episode started in the past 7 days. The problem has been gradually worsening. The maximum temperature recorded prior to her arrival was 100.4 - 100.9 F. The fever has been present for less than 1 day. Associated symptoms include coughing, headaches and  "wheezing. Pertinent negatives include no chest pain, rhinorrhea or sore throat. She has tried NSAIDs and increased fluids for the symptoms. The treatment provided mild relief.       Review of Systems   Constitutional: Positive for chills, fever and malaise/fatigue.   HENT: Negative for rhinorrhea and sore throat.    Respiratory: Positive for cough and wheezing.    Cardiovascular: Negative for chest pain.   Musculoskeletal: Positive for myalgias.   Neurological: Positive for headaches.   All other systems reviewed and are negative.         Objective:     /70 (BP Location: Right arm, Patient Position: Sitting, BP Cuff Size: Large adult)   Pulse (!) 114   Temp 36.9 °C (98.4 °F) (Temporal)   Ht 1.626 m (5' 4\")   Wt 101 kg (223 lb)   SpO2 95%   BMI 38.28 kg/m²      Physical Exam  Vitals and nursing note reviewed.   Constitutional:       General: She is not in acute distress.     Appearance: Normal appearance. She is well-developed and normal weight. She is not ill-appearing or toxic-appearing.   HENT:      Head: Normocephalic and atraumatic.      Right Ear: Tympanic membrane normal.      Left Ear: Tympanic membrane normal.      Nose: Mucosal edema and rhinorrhea present.      Mouth/Throat:      Mouth: Mucous membranes are moist.      Pharynx: Uvula midline.   Eyes:      Extraocular Movements: Extraocular movements intact.      Conjunctiva/sclera: Conjunctivae normal.      Pupils: Pupils are equal, round, and reactive to light.   Cardiovascular:      Rate and Rhythm: Regular rhythm. Tachycardia present.      Pulses: Normal pulses.      Heart sounds: Normal heart sounds.   Pulmonary:      Effort: Pulmonary effort is normal. No respiratory distress.      Breath sounds: No stridor. Wheezing present. No rhonchi or rales.   Abdominal:      Palpations: Abdomen is soft.   Musculoskeletal:         General: Normal range of motion.      Cervical back: Normal range of motion and neck supple.   Skin:     General: Skin is " warm and dry.      Capillary Refill: Capillary refill takes less than 2 seconds.   Neurological:      General: No focal deficit present.      Mental Status: She is alert and oriented to person, place, and time.      Gait: Gait normal.   Psychiatric:         Mood and Affect: Mood normal.         Behavior: Behavior is cooperative.              Assessment/Plan:         1. Cough  COVID/SARS CoV-2 PCR    albuterol 108 (90 Base) MCG/ACT Aero Soln inhalation aerosol   2. Fever, unspecified fever cause  COVID/SARS CoV-2 PCR    albuterol 108 (90 Base) MCG/ACT Aero Soln inhalation aerosol   3. Acute bronchospasm  COVID/SARS CoV-2 PCR    albuterol 108 (90 Base) MCG/ACT Aero Soln inhalation aerosol   4. At increased risk of exposure to COVID-19 virus  COVID/SARS CoV-2 PCR    albuterol 108 (90 Base) MCG/ACT Aero Soln inhalation aerosol     Per protocol for PUI/ISO patients, the patient was evaluated by me while I was wearing PPE.  Per CDC guidelines, patient has been instructed to self quarantine at home for at least 10 days from onset of symptoms and at least 3 full days after resolution of fever/respiratory symptoms.  PT verbalized agreement to do so.      Discussed that I felt this was viral in nature. Did not see any evidence of a bacterial process. Discussed natural progression and sx care.    PT should follow up with PCP in 1-2 days for re-evaluation if symptoms have not improved.      Discussed red flags and reasons to return to UC or ED.      Pt and/or family verbalized understanding of diagnosis and follow up instructions and was offered informational handout on diagnosis.  PT discharged.     I have spent at least 30 minutes on the care of this patient.  This includes preparing for visit which includes review of previous visits if available in EMR, obtaining HPI, exam and evaluation of patient, ordering and independent interpretation of labs, imaging, tests, medical management, counseling, education and documentation.

## 2021-07-23 LAB
COVID ORDER STATUS COVID19: NORMAL
SARS-COV-2 RNA RESP QL NAA+PROBE: NOTDETECTED
SPECIMEN SOURCE: NORMAL

## 2022-12-23 LAB
ABO GROUP BLD: NORMAL
BLD GP AB SCN SERPL QL: NORMAL
C TRACH DNA GENITAL QL NAA+PROBE: NORMAL
HBV SURFACE AG SERPL QL IA: NORMAL
HIV 1+2 AB+HIV1 P24 AG SERPL QL IA: NORMAL
N GONORRHOEA DNA GENITAL QL NAA+PROBE: NORMAL
RH BLD: NORMAL
RUBV IGG SERPL IA-ACNC: NORMAL
TREPONEMA PALLIDUM IGG+IGM AB [PRESENCE] IN SERUM OR PLASMA BY IMMUNOASSAY: NORMAL

## 2022-12-27 LAB — GLUCOSE TOL INTERP 786: 111

## 2023-04-14 ENCOUNTER — HOSPITAL ENCOUNTER (OUTPATIENT)
Facility: MEDICAL CENTER | Age: 38
End: 2023-04-14
Attending: OBSTETRICS & GYNECOLOGY | Admitting: OBSTETRICS & GYNECOLOGY
Payer: COMMERCIAL

## 2023-04-14 VITALS
WEIGHT: 251 LBS | DIASTOLIC BLOOD PRESSURE: 72 MMHG | BODY MASS INDEX: 41.82 KG/M2 | SYSTOLIC BLOOD PRESSURE: 125 MMHG | RESPIRATION RATE: 18 BRPM | HEIGHT: 65 IN | OXYGEN SATURATION: 96 % | HEART RATE: 118 BPM

## 2023-04-14 LAB
APPEARANCE UR: CLEAR
COLOR UR AUTO: YELLOW
GLUCOSE UR QL STRIP.AUTO: NEGATIVE MG/DL
KETONES UR QL STRIP.AUTO: NEGATIVE MG/DL
LEUKOCYTE ESTERASE UR QL STRIP.AUTO: NEGATIVE
NITRITE UR QL STRIP.AUTO: NEGATIVE
PH UR STRIP.AUTO: 7 [PH] (ref 5–8)
PROT UR QL STRIP: NEGATIVE MG/DL
RBC UR QL AUTO: ABNORMAL
SP GR UR STRIP.AUTO: 1.01 (ref 1–1.03)

## 2023-04-14 PROCEDURE — 81002 URINALYSIS NONAUTO W/O SCOPE: CPT

## 2023-04-14 ASSESSMENT — PAIN SCALES - GENERAL: PAINLEVEL: 0 - NO PAIN

## 2023-04-14 NOTE — PROGRESS NOTES
[facility] -  EGA - 36-4    0739 - Pt arrived to labor and delivery for lower abdominal cramping/pulsating with movement. Pt placed in room LDA3.  0816 - External monitors in place X2. Category I FHT at this time. VSS. No complaints of contractions, ROM or vaginal bleeding. POC discussed with pt, all questions answered.    0856 - Report given to Dr Michele. Orders received.   0910 - DC instructions reviewed with pt. Encouraged rest, hydration, and use of a belly band during pregnancy. Discussed round ligament pain DC instructions. Pt expressed understanding and is comfortable with discharge.   0914 - Pt ambulated off unit in stable condition with belongings in hand.

## 2023-05-13 ENCOUNTER — HOSPITAL ENCOUNTER (EMERGENCY)
Facility: MEDICAL CENTER | Age: 38
End: 2023-05-13
Attending: OBSTETRICS & GYNECOLOGY | Admitting: OBSTETRICS & GYNECOLOGY
Payer: COMMERCIAL

## 2023-05-13 VITALS
HEART RATE: 104 BPM | RESPIRATION RATE: 16 BRPM | WEIGHT: 274 LBS | TEMPERATURE: 97 F | SYSTOLIC BLOOD PRESSURE: 139 MMHG | DIASTOLIC BLOOD PRESSURE: 73 MMHG | HEIGHT: 65 IN | BODY MASS INDEX: 45.65 KG/M2

## 2023-05-13 PROCEDURE — 99284 EMERGENCY DEPT VISIT MOD MDM: CPT

## 2023-05-13 PROCEDURE — 59025 FETAL NON-STRESS TEST: CPT

## 2023-05-13 ASSESSMENT — PAIN SCALES - GENERAL: PAINLEVEL: 0 - NO PAIN

## 2023-05-13 NOTE — PROGRESS NOTES
38 y.o.  @ 30.5 here to L&D 231 for extended monitoring after a fall. Pt reports positive fetal movement, denies vaginal bleeding or LOF.   Bedside US performed by MD. No evidence of abruption. FIDEL WNL.   Anterior placenta, breech presentation. Reactive NST obtained. Difficulty keeping baby on the monitor. RN at bedside hand holding monitor.   1500 Pt requesting MD to review tracing to see if pt may discharg.   MD reviewed tracing. Discharge order received. Pt educated to return to the hospital for vaginal bleeding, LOF, decreased fetal movement or increased pain.

## 2023-05-14 NOTE — ED PROVIDER NOTES
"DATE OF SERVICE:  2023     CHIEF COMPLAINT:  \"I fell in the parking lot.\"     HISTORY OF PRESENT ILLNESS:  The patient is a 38-year-old lady,  3,   para 0.  Unfortunately, we do not have prenatal records, but she seems like an   excellent historian.  She reports that her SHADY is 2023, which would   make her EGA 30 and 5/7 weeks.  She states that she fell because of an   irregularity on the surface of a parking lot at 11:00 a.m. today.  She fell to   her hands, knees and her pregnant abdomen.  She has some pain in her right   knee.  She denies abdominal pain.  She ambulated here without difficulty.  She   reports normal fetal movement.  She has had no vaginal bleeding whatsoever.    She reports that her blood type is \"B positive.\"  The incident was 1 hour and   41 minutes ago.     PRENATAL CARE:  She states that her blood type is B positive.  She says   ultrasounds have revealed normal fetal anatomy, that her blood pressures have   been normal and there have been no prenatal complications.     OBSTETRIC HISTORY:  SAB x2.     PAST MEDICAL HISTORY:  Positive for obesity.     ALLERGIES:  No known drug allergies.     MEDICATIONS:  Prenatal vitamin daily.     PAST SURGICAL HISTORY:  D and C x1, 2020 for incomplete miscarriage.     PHYSICAL EXAMINATION:  VITAL SIGNS:  T 97.0, /73, P 104  GENERAL:  She is in no acute distress.  HEENT:  Normal.  LUNGS:  Clear to auscultation.  HEART:  Sounds normal.  ABDOMEN:  Gravid uterus, is nontender.  No hepatosplenomegaly.  BACK:  No CVA tenderness.  EXTREMITIES:  Trace edema.  Homans' negative.  DTRs normal.  She has a small   amount of ecchymosis just under the right knee cap.  Her distal right femur,   her patella and her superior tibia are nontender to palpation.  She has full   range of motion.  There is no crepitance.  All the other extremities are   within normal limits.    PELVIC:  Fetal monitoring thus far has been reassuring.  Monitoring is "   challenging because of obesity.     DIAGNOSTIC DATA:  Ultrasound:  I did my own bedside ultrasound.  The baby   presents breech.  There is normal amniotic fluid volume with a maximum   vertical pocket of 3.72 cm.  She has an anterior placenta remote from the   cervix and I do not see any extra chorionic clot.     DIAGNOSES:  1.  A 30 and 5/7 week gestation.  2.  Status post fall.     PLAN:  We will monitor her fetal heart rate and uterus for 4 hours,   discharge home if this is reassuring and she remains asymptomatic.  She will   be instructed to return if she has any vaginal bleeding or abdominal pain.  I   think it is   unlikely that her placenta or fetus sustained an injury.        ______________________________  MD SUNIL Jacobson/NATHALY    DD:  05/13/2023 12:45  DT:  05/13/2023 17:49    Job#:  [id number]    CC:YASH TEIXEIRA MD

## 2023-06-12 ENCOUNTER — HOSPITAL ENCOUNTER (EMERGENCY)
Facility: MEDICAL CENTER | Age: 38
End: 2023-06-12
Attending: OBSTETRICS & GYNECOLOGY | Admitting: OBSTETRICS & GYNECOLOGY
Payer: COMMERCIAL

## 2023-06-12 ENCOUNTER — APPOINTMENT (OUTPATIENT)
Dept: RADIOLOGY | Facility: MEDICAL CENTER | Age: 38
End: 2023-06-12
Attending: OBSTETRICS & GYNECOLOGY
Payer: COMMERCIAL

## 2023-06-12 VITALS
WEIGHT: 275 LBS | DIASTOLIC BLOOD PRESSURE: 71 MMHG | SYSTOLIC BLOOD PRESSURE: 117 MMHG | HEIGHT: 65 IN | BODY MASS INDEX: 45.82 KG/M2 | TEMPERATURE: 97.1 F | HEART RATE: 94 BPM | RESPIRATION RATE: 16 BRPM | OXYGEN SATURATION: 99 %

## 2023-06-12 LAB
ALBUMIN SERPL BCP-MCNC: 3.5 G/DL (ref 3.2–4.9)
ALBUMIN/GLOB SERPL: 1.1 G/DL
ALP SERPL-CCNC: 80 U/L (ref 30–99)
ALT SERPL-CCNC: 15 U/L (ref 2–50)
ANION GAP SERPL CALC-SCNC: 12 MMOL/L (ref 7–16)
APPEARANCE UR: CLEAR
AST SERPL-CCNC: 22 U/L (ref 12–45)
BASOPHILS # BLD AUTO: 0.3 % (ref 0–1.8)
BASOPHILS # BLD: 0.04 K/UL (ref 0–0.12)
BILIRUB SERPL-MCNC: 0.2 MG/DL (ref 0.1–1.5)
BUN SERPL-MCNC: 10 MG/DL (ref 8–22)
CALCIUM ALBUM COR SERPL-MCNC: 9.6 MG/DL (ref 8.5–10.5)
CALCIUM SERPL-MCNC: 9.2 MG/DL (ref 8.5–10.5)
CHLORIDE SERPL-SCNC: 105 MMOL/L (ref 96–112)
CO2 SERPL-SCNC: 19 MMOL/L (ref 20–33)
COLOR UR AUTO: YELLOW
CREAT SERPL-MCNC: 0.59 MG/DL (ref 0.5–1.4)
CREAT UR-MCNC: 90.75 MG/DL
EOSINOPHIL # BLD AUTO: 0.06 K/UL (ref 0–0.51)
EOSINOPHIL NFR BLD: 0.5 % (ref 0–6.9)
ERYTHROCYTE [DISTWIDTH] IN BLOOD BY AUTOMATED COUNT: 45.1 FL (ref 35.9–50)
GFR SERPLBLD CREATININE-BSD FMLA CKD-EPI: 119 ML/MIN/1.73 M 2
GLOBULIN SER CALC-MCNC: 3.1 G/DL (ref 1.9–3.5)
GLUCOSE SERPL-MCNC: 88 MG/DL (ref 65–99)
GLUCOSE UR QL STRIP.AUTO: NEGATIVE MG/DL
HCT VFR BLD AUTO: 35.2 % (ref 37–47)
HGB BLD-MCNC: 12.4 G/DL (ref 12–16)
IMM GRANULOCYTES # BLD AUTO: 0.19 K/UL (ref 0–0.11)
IMM GRANULOCYTES NFR BLD AUTO: 1.6 % (ref 0–0.9)
KETONES UR QL STRIP.AUTO: NEGATIVE MG/DL
LEUKOCYTE ESTERASE UR QL STRIP.AUTO: ABNORMAL
LYMPHOCYTES # BLD AUTO: 2.34 K/UL (ref 1–4.8)
LYMPHOCYTES NFR BLD: 20 % (ref 22–41)
MCH RBC QN AUTO: 30.9 PG (ref 27–33)
MCHC RBC AUTO-ENTMCNC: 35.2 G/DL (ref 32.2–35.5)
MCV RBC AUTO: 87.8 FL (ref 81.4–97.8)
MONOCYTES # BLD AUTO: 0.72 K/UL (ref 0–0.85)
MONOCYTES NFR BLD AUTO: 6.1 % (ref 0–13.4)
NEUTROPHILS # BLD AUTO: 8.37 K/UL (ref 1.82–7.42)
NEUTROPHILS NFR BLD: 71.5 % (ref 44–72)
NITRITE UR QL STRIP.AUTO: NEGATIVE
NRBC # BLD AUTO: 0 K/UL
NRBC BLD-RTO: 0 /100 WBC (ref 0–0.2)
PH UR STRIP.AUTO: 6.5 [PH] (ref 5–8)
PLATELET # BLD AUTO: 338 K/UL (ref 164–446)
PMV BLD AUTO: 9.3 FL (ref 9–12.9)
POTASSIUM SERPL-SCNC: 4 MMOL/L (ref 3.6–5.5)
PROT SERPL-MCNC: 6.6 G/DL (ref 6–8.2)
PROT UR QL STRIP: NEGATIVE MG/DL
PROT UR-MCNC: 8 MG/DL (ref 0–15)
PROT/CREAT UR: 88 MG/G (ref 10–107)
RBC # BLD AUTO: 4.01 M/UL (ref 4.2–5.4)
RBC UR QL AUTO: NEGATIVE
SODIUM SERPL-SCNC: 136 MMOL/L (ref 135–145)
SP GR UR STRIP.AUTO: 1.02 (ref 1–1.03)
WBC # BLD AUTO: 11.7 K/UL (ref 4.8–10.8)

## 2023-06-12 PROCEDURE — 76819 FETAL BIOPHYS PROFIL W/O NST: CPT

## 2023-06-12 PROCEDURE — 82570 ASSAY OF URINE CREATININE: CPT

## 2023-06-12 PROCEDURE — 36415 COLL VENOUS BLD VENIPUNCTURE: CPT

## 2023-06-12 PROCEDURE — 84156 ASSAY OF PROTEIN URINE: CPT

## 2023-06-12 PROCEDURE — 81002 URINALYSIS NONAUTO W/O SCOPE: CPT

## 2023-06-12 PROCEDURE — 302449 STATCHG TRIAGE ONLY (STATISTIC)

## 2023-06-12 PROCEDURE — 80053 COMPREHEN METABOLIC PANEL: CPT

## 2023-06-12 PROCEDURE — 85025 COMPLETE CBC W/AUTO DIFF WBC: CPT

## 2023-06-12 ASSESSMENT — PAIN SCALES - GENERAL: PAINLEVEL: 0 - NO PAIN

## 2023-06-12 NOTE — PROGRESS NOTES
1310: Pt is a  at 35.0 weeks today, pt presents to L&D after being instructed to come due to elevated BP's taken at home, and upper abd pain earlier this morning. Pt reports +FM, denies Vb/LOF/Uc's at this time. Pt denies HA, visual changes, RUQ pain at this time. EFM and TOCO applied, VS taken, pt comfortable in bed at this time.    1408: This RN telephoned Dr Michele, updated on pt status/VS/FHT. Orders received, see orders for details.    1410: This RN telephoned Dr Michele, updated on lab results/VS/pt status. Discharge orders received, see orders for details.    1415:  Discharge instructions given including  labor precautions, UC's, ROM, VB, abn FM, when to return to L&D, f/u with Dr. Michele, pelvic rest and modified bedrest. Questions answered at length. Pt verbalized understanding and agreement with POC and discharge. Discharge instructions signed.

## 2023-06-14 LAB — GP B STREP DNA SPEC QL NAA+PROBE: NORMAL

## 2023-07-14 ENCOUNTER — HOSPITAL ENCOUNTER (INPATIENT)
Facility: MEDICAL CENTER | Age: 38
LOS: 3 days | End: 2023-07-17
Attending: OBSTETRICS & GYNECOLOGY | Admitting: OBSTETRICS & GYNECOLOGY
Payer: COMMERCIAL

## 2023-07-14 DIAGNOSIS — Z78.9 BREASTFEEDING (INFANT): ICD-10-CM

## 2023-07-14 DIAGNOSIS — G89.18 POST-OP PAIN: ICD-10-CM

## 2023-07-14 LAB
ALBUMIN SERPL BCP-MCNC: 3.2 G/DL (ref 3.2–4.9)
ALBUMIN/GLOB SERPL: 1 G/DL
ALP SERPL-CCNC: 104 U/L (ref 30–99)
ALT SERPL-CCNC: 18 U/L (ref 2–50)
ANION GAP SERPL CALC-SCNC: 10 MMOL/L (ref 7–16)
AST SERPL-CCNC: 22 U/L (ref 12–45)
BASOPHILS # BLD AUTO: 0.1 % (ref 0–1.8)
BASOPHILS # BLD: 0.01 K/UL (ref 0–0.12)
BILIRUB SERPL-MCNC: 0.2 MG/DL (ref 0.1–1.5)
BUN SERPL-MCNC: 12 MG/DL (ref 8–22)
CALCIUM ALBUM COR SERPL-MCNC: 10.1 MG/DL (ref 8.5–10.5)
CALCIUM SERPL-MCNC: 9.5 MG/DL (ref 8.5–10.5)
CHLORIDE SERPL-SCNC: 103 MMOL/L (ref 96–112)
CO2 SERPL-SCNC: 21 MMOL/L (ref 20–33)
CREAT SERPL-MCNC: 0.72 MG/DL (ref 0.5–1.4)
CREAT UR-MCNC: 48.38 MG/DL
EOSINOPHIL # BLD AUTO: 0.04 K/UL (ref 0–0.51)
EOSINOPHIL NFR BLD: 0.4 % (ref 0–6.9)
ERYTHROCYTE [DISTWIDTH] IN BLOOD BY AUTOMATED COUNT: 46.7 FL (ref 35.9–50)
GFR SERPLBLD CREATININE-BSD FMLA CKD-EPI: 110 ML/MIN/1.73 M 2
GLOBULIN SER CALC-MCNC: 3.1 G/DL (ref 1.9–3.5)
GLUCOSE SERPL-MCNC: 99 MG/DL (ref 65–99)
HCT VFR BLD AUTO: 38.3 % (ref 37–47)
HGB BLD-MCNC: 13.3 G/DL (ref 12–16)
HOLDING TUBE BB 8507: NORMAL
IMM GRANULOCYTES # BLD AUTO: 0.1 K/UL (ref 0–0.11)
IMM GRANULOCYTES NFR BLD AUTO: 1 % (ref 0–0.9)
LDH SERPL L TO P-CCNC: 198 U/L (ref 107–266)
LYMPHOCYTES # BLD AUTO: 1.81 K/UL (ref 1–4.8)
LYMPHOCYTES NFR BLD: 17.8 % (ref 22–41)
MCH RBC QN AUTO: 31.1 PG (ref 27–33)
MCHC RBC AUTO-ENTMCNC: 34.7 G/DL (ref 32.2–35.5)
MCV RBC AUTO: 89.7 FL (ref 81.4–97.8)
MONOCYTES # BLD AUTO: 0.61 K/UL (ref 0–0.85)
MONOCYTES NFR BLD AUTO: 6 % (ref 0–13.4)
NEUTROPHILS # BLD AUTO: 7.6 K/UL (ref 1.82–7.42)
NEUTROPHILS NFR BLD: 74.7 % (ref 44–72)
NRBC # BLD AUTO: 0 K/UL
NRBC BLD-RTO: 0 /100 WBC (ref 0–0.2)
PLATELET # BLD AUTO: 279 K/UL (ref 164–446)
PMV BLD AUTO: 9.9 FL (ref 9–12.9)
POTASSIUM SERPL-SCNC: 4.1 MMOL/L (ref 3.6–5.5)
PROT SERPL-MCNC: 6.3 G/DL (ref 6–8.2)
PROT UR-MCNC: 9.4 MG/DL (ref 0–15)
PROT/CREAT UR: 194 MG/G (ref 10–107)
RBC # BLD AUTO: 4.27 M/UL (ref 4.2–5.4)
SODIUM SERPL-SCNC: 134 MMOL/L (ref 135–145)
T PALLIDUM AB SER QL IA: NORMAL
URATE SERPL-MCNC: 5.1 MG/DL (ref 1.9–8.2)
WBC # BLD AUTO: 10.2 K/UL (ref 4.8–10.8)

## 2023-07-14 PROCEDURE — 82570 ASSAY OF URINE CREATININE: CPT

## 2023-07-14 PROCEDURE — 770002 HCHG ROOM/CARE - OB PRIVATE (112)

## 2023-07-14 PROCEDURE — 86780 TREPONEMA PALLIDUM: CPT

## 2023-07-14 PROCEDURE — 84550 ASSAY OF BLOOD/URIC ACID: CPT

## 2023-07-14 PROCEDURE — 36415 COLL VENOUS BLD VENIPUNCTURE: CPT

## 2023-07-14 PROCEDURE — 85025 COMPLETE CBC W/AUTO DIFF WBC: CPT

## 2023-07-14 PROCEDURE — A9270 NON-COVERED ITEM OR SERVICE: HCPCS | Performed by: OBSTETRICS & GYNECOLOGY

## 2023-07-14 PROCEDURE — 700102 HCHG RX REV CODE 250 W/ 637 OVERRIDE(OP): Performed by: OBSTETRICS & GYNECOLOGY

## 2023-07-14 PROCEDURE — 84156 ASSAY OF PROTEIN URINE: CPT

## 2023-07-14 PROCEDURE — 700105 HCHG RX REV CODE 258: Mod: JZ | Performed by: OBSTETRICS & GYNECOLOGY

## 2023-07-14 PROCEDURE — 3E0P7VZ INTRODUCTION OF HORMONE INTO FEMALE REPRODUCTIVE, VIA NATURAL OR ARTIFICIAL OPENING: ICD-10-PCS | Performed by: OBSTETRICS & GYNECOLOGY

## 2023-07-14 PROCEDURE — 80053 COMPREHEN METABOLIC PANEL: CPT

## 2023-07-14 PROCEDURE — 3E033VJ INTRODUCTION OF OTHER HORMONE INTO PERIPHERAL VEIN, PERCUTANEOUS APPROACH: ICD-10-PCS | Performed by: OBSTETRICS & GYNECOLOGY

## 2023-07-14 PROCEDURE — 83615 LACTATE (LD) (LDH) ENZYME: CPT

## 2023-07-14 PROCEDURE — 700111 HCHG RX REV CODE 636 W/ 250 OVERRIDE (IP): Performed by: OBSTETRICS & GYNECOLOGY

## 2023-07-14 RX ORDER — OXYTOCIN 10 [USP'U]/ML
10 INJECTION, SOLUTION INTRAMUSCULAR; INTRAVENOUS
Status: DISCONTINUED | OUTPATIENT
Start: 2023-07-14 | End: 2023-07-15 | Stop reason: HOSPADM

## 2023-07-14 RX ORDER — MISOPROSTOL 200 UG/1
800 TABLET ORAL
Status: DISCONTINUED | OUTPATIENT
Start: 2023-07-14 | End: 2023-07-15 | Stop reason: HOSPADM

## 2023-07-14 RX ORDER — IBUPROFEN 800 MG/1
800 TABLET ORAL
Status: DISCONTINUED | OUTPATIENT
Start: 2023-07-14 | End: 2023-07-15 | Stop reason: HOSPADM

## 2023-07-14 RX ORDER — SODIUM CHLORIDE, SODIUM LACTATE, POTASSIUM CHLORIDE, CALCIUM CHLORIDE 600; 310; 30; 20 MG/100ML; MG/100ML; MG/100ML; MG/100ML
INJECTION, SOLUTION INTRAVENOUS CONTINUOUS
Status: DISCONTINUED | OUTPATIENT
Start: 2023-07-14 | End: 2023-07-16 | Stop reason: HOSPADM

## 2023-07-14 RX ORDER — ONDANSETRON 4 MG/1
4 TABLET, ORALLY DISINTEGRATING ORAL EVERY 6 HOURS PRN
Status: DISCONTINUED | OUTPATIENT
Start: 2023-07-14 | End: 2023-07-15 | Stop reason: HOSPADM

## 2023-07-14 RX ORDER — CALCIUM CARBONATE 500 MG/1
1000 TABLET, CHEWABLE ORAL 4 TIMES DAILY PRN
Status: DISCONTINUED | OUTPATIENT
Start: 2023-07-14 | End: 2023-07-15

## 2023-07-14 RX ORDER — TERBUTALINE SULFATE 1 MG/ML
0.25 INJECTION, SOLUTION SUBCUTANEOUS
Status: DISCONTINUED | OUTPATIENT
Start: 2023-07-14 | End: 2023-07-15 | Stop reason: HOSPADM

## 2023-07-14 RX ORDER — ACETAMINOPHEN 500 MG
1000 TABLET ORAL
Status: DISCONTINUED | OUTPATIENT
Start: 2023-07-14 | End: 2023-07-15 | Stop reason: HOSPADM

## 2023-07-14 RX ORDER — LIDOCAINE HYDROCHLORIDE 10 MG/ML
20 INJECTION, SOLUTION INFILTRATION; PERINEURAL
Status: DISCONTINUED | OUTPATIENT
Start: 2023-07-14 | End: 2023-07-15 | Stop reason: HOSPADM

## 2023-07-14 RX ORDER — ONDANSETRON 2 MG/ML
4 INJECTION INTRAMUSCULAR; INTRAVENOUS EVERY 6 HOURS PRN
Status: DISCONTINUED | OUTPATIENT
Start: 2023-07-14 | End: 2023-07-15 | Stop reason: HOSPADM

## 2023-07-14 RX ADMIN — MISOPROSTOL 25 MCG: 100 TABLET ORAL at 08:08

## 2023-07-14 RX ADMIN — OXYTOCIN 2 MILLI-UNITS/MIN: 10 INJECTION, SOLUTION INTRAMUSCULAR; INTRAVENOUS at 17:26

## 2023-07-14 RX ADMIN — SODIUM CHLORIDE, POTASSIUM CHLORIDE, SODIUM LACTATE AND CALCIUM CHLORIDE: 600; 310; 30; 20 INJECTION, SOLUTION INTRAVENOUS at 17:23

## 2023-07-14 RX ADMIN — MISOPROSTOL 25 MCG: 100 TABLET ORAL at 13:00

## 2023-07-14 ASSESSMENT — LIFESTYLE VARIABLES
EVER HAD A DRINK FIRST THING IN THE MORNING TO STEADY YOUR NERVES TO GET RID OF A HANGOVER: NO
ALCOHOL_USE: NO
DOES PATIENT WANT TO STOP DRINKING: NO
EVER FELT BAD OR GUILTY ABOUT YOUR DRINKING: NO
TOTAL SCORE: 0
TOTAL SCORE: 0
AVERAGE NUMBER OF DAYS PER WEEK YOU HAVE A DRINK CONTAINING ALCOHOL: 0
HOW MANY TIMES IN THE PAST YEAR HAVE YOU HAD 5 OR MORE DRINKS IN A DAY: 0
EVER_SMOKED: YES
ON A TYPICAL DAY WHEN YOU DRINK ALCOHOL HOW MANY DRINKS DO YOU HAVE: 0
HAVE PEOPLE ANNOYED YOU BY CRITICIZING YOUR DRINKING: NO
HAVE YOU EVER FELT YOU SHOULD CUT DOWN ON YOUR DRINKING: NO
CONSUMPTION TOTAL: NEGATIVE
TOTAL SCORE: 0

## 2023-07-14 ASSESSMENT — COPD QUESTIONNAIRES
COPD SCREENING SCORE: 2
IN THE PAST 12 MONTHS DO YOU DO LESS THAN YOU USED TO BECAUSE OF YOUR BREATHING PROBLEMS: DISAGREE/UNSURE
DO YOU EVER COUGH UP ANY MUCUS OR PHLEGM?: NO/ONLY WITH OCCASIONAL COLDS OR INFECTIONS
DURING THE PAST 4 WEEKS HOW MUCH DID YOU FEEL SHORT OF BREATH: NONE/LITTLE OF THE TIME
HAVE YOU SMOKED AT LEAST 100 CIGARETTES IN YOUR ENTIRE LIFE: YES

## 2023-07-14 ASSESSMENT — PATIENT HEALTH QUESTIONNAIRE - PHQ9
1. LITTLE INTEREST OR PLEASURE IN DOING THINGS: NOT AT ALL
2. FEELING DOWN, DEPRESSED, IRRITABLE, OR HOPELESS: NOT AT ALL
SUM OF ALL RESPONSES TO PHQ9 QUESTIONS 1 AND 2: 0

## 2023-07-14 ASSESSMENT — PAIN DESCRIPTION - PAIN TYPE
TYPE: ACUTE PAIN

## 2023-07-14 ASSESSMENT — PAIN SCALES - GENERAL: PAINLEVEL: 0 - NO PAIN

## 2023-07-14 ASSESSMENT — FIBROSIS 4 INDEX: FIB4 SCORE: 0.62

## 2023-07-14 NOTE — H&P
DATE OF ADMISSION:  2023     ADMISSION DIAGNOSES:    1.  Intrauterine pregnancy at 39+4 weeks' gestation.  2.  Advanced maternal age.  3.  GBS negative.  4.  Borderline gestational hypertension.  5.  Morbid obesity with a BMI of 47.     HISTORY OF PRESENT ILLNESS:  The patient is a 38-year-old  3, para   0-0-2-0 at 39+4 weeks' gestation, based upon her LMP of 10/10/2022, which is   consistent with a 9+4 week ultrasound performed on 2022.  The patient   presents to labor and delivery for scheduled induction of labor secondary to   AMA status and borderline gestational hypertension.  The patient has been   followed by [facility] and they recommended delivery between   39 and 40 weeks.  Her most recent growth ultrasound was performed on   2023, and at that time, the estimated fetal weight was 2815 grams, 6   pounds 3 ounces in the 50th percentile for growth.  The patient reports   excellent fetal movement.  She denies vaginal bleeding, vaginal discharge,   leakage of fluid, and regular and painful uterine cramping and contractions.    Discussed the plan for induction of labor with cervical ripening followed by   IV Pitocin.     Prenatal care with Dr. Lisandra Michele, first visit on 12/15/2022, total visits   14, total weight gain 62 pounds.  Third trimester blood pressures 115-143 over   114-76.  There was one elevated blood pressure on 2023 of 143/114 and   she has been sent to labor and delivery for evaluation of preeclampsia and   workup was negative at that time.     PRENATAL LABS:  GBS negative on 2023.  Blood type B positive, antibody   screen negative, rubella immune, RPR nonreactive, hepatitis B surface antigen   negative.  Hep C viral antibody negative, HIV negative.  SMA fragile X and CF   testing negative.  NIPT testing negative for trisomies 21, 18, and 13, female   fetus.  One-hour .  MSAFP for open spina bifida risk screen negative.    GC chlamydia  negative, negative.     OBSTETRIC ULTRASOUND:    1.  On 12/16/2022 at 9+4 weeks' gestation, SHADY 07/17/2023.  2.  On 12/30/2022 at 11+6 weeks' gestation, SHADY 07/15/2023.  3.  On 01/12/2023 at 13+3 weeks' gestation, SHADY 07/13/2023.  4.  On 12/17/2023 at 20 weeks 0 days.  Estimated fetal weight 343 grams, 12   ounces.  Cervical length 4.41 cm.  Vertex presentation.  Anterior placenta, no   previa.  Female fetus.  5.  On 05/22/2023 at 32+4 weeks' gestation, vertex presentation.  Estimated   fetal weight 1911 grams, 4 pounds 3 ounces, 44th percentile.  FIDEL 12.    Anterior placenta, no previa.  Fetal heart rate 130 beats per minute.  6.  On 06/19/2023, estimated fetal weight 2815 grams, 6 pounds 3 ounces, 50th   percentile for growth.  7.  [place] IUP at 36 weeks 0 days, SHADY 07/17/2023.  Vertex presentation.    Normal fetal growth.  Anterior placenta.  Recommendation made for delivery   during the 39th week.     PAST OBSTETRIC HISTORY:  SABs x2 in 2021 and 2022.     PAST MEDICAL HISTORY:  1.  Morbid obesity with a BMI of 47.  2.  PCOS.  3.  Asthma issues this pregnancy, AMA.  4.  Morbid obesity.  5.  Borderline gestational hypertension.     ALLERGIES: No known drug allergies.     SOCIAL HISTORY:  She is .  She denies alcohol, tobacco or drugs of   abuse.     MEDICATIONS:  Prenatal vitamins.  She was taking metformin earlier in the   pregnancy, but stopped prior to her 24-28 week labs and had a normal 1-hour   GCT on 01/27/2022.     ISSUES THIS PREGNANCY: Excessive weight gain.     PHYSICAL EXAMINATION:    VITAL SIGNS:  Blood pressure 133/82, pulse 101, temp 96.5, respiratory rate   16.  GENERAL:  Alert, awake and oriented x3, in no acute distress.  ABDOMEN:  Gravid and obese.  Vertex by Leopold's, estimated fetal weight 3300   grams.  PELVIC:  Sterile vaginal exam per my exam at 0650, 1-2 cm dilated, 50%   effaced, -3 station, posterior moderate consistency and deviated off to the   left.     LABORATORY DATA:   Admission labs are pending.  Joice, occasional spontaneous   contractions noted.  External fetal monitoring, category 1 tracing, reactive,   without decelerations.     Admission labs including preeclampsia labs are pending.     ASSESSMENT:  A 38-year-old  3, para 0-0-2-0 at 39+4 weeks' gestation,   based upon her last menstrual period of 10/10/2022 and consistent with a 9+4   week ultrasound performed on 2022, who presents for induction of labor   secondary to AMA status and borderline gestational hypertension, who is   followed by at [facility], who recommended delivery during the   39th week.  She has morbid obesity and excessive weight gain with a normal   1-hour GCT and GBS negative.     PLAN:  The patient will be admitted to labor and delivery.  We will monitor   her blood pressure closely and obtain preeclampsia labs.  Her cervix is in   need of cervical ripening and she will receive 25 mcg of buccal Cytotec every   4 hours and then be transitioned to IV Pitocin.  She may have an epidural for   labor analgesia and will undergo amniotomy with likely placement of internal   monitors after her epidural.  We will anticipate a normal spontaneous vaginal   delivery and be prepared for possible shoulder dystocia and postpartum   hemorrhage.        ______________________________  Lisandra Irizarry MD    //    DD:  2023 07:31  DT:  2023 09:05    Job#:  [id number]

## 2023-07-14 NOTE — PROGRESS NOTES
0700 - Report received. POC discussed.   0730 - Orders received from Dr Michele.   1230 - SVE as noted. Updated Dr Michele. New orders placed.   1600 - Dr Michele as bedside. MD aware of difficultly monitoring.   1636 - SVE as noted in flowsheet.   1700 - Report given to Dr Hinson.   1900 - Report given. POC discussed.

## 2023-07-14 NOTE — PROGRESS NOTES
"1.  Intrauterine pregnancy at 39+4 weeks' gestation.  2.  Advanced maternal age.  3.  GBS negative.  4.  Borderline gestational hypertension.  5.  Morbid obesity with a BMI of 47.    S:  Doing well.  Feeling intermittent contractions.  Discussed need for cervical examination.  Discussed starting IV pitocin.  Questions answered.      O: /72   Pulse 89   Temp 36 °C (96.8 °F) (Temporal)   Resp 16   Ht 1.651 m (5' 5\")   Wt (!) 129 kg (284 lb)   SpO2 96%     A, A, and O x 3 NAD    EFM: intermittent tracing noted.  Reactive without     TOCO: every 3-8 minutes    SVE: 2-3/50%/-2/mid/moderate         Latest Reference Range & Units 07/14/23 07:40 07/14/23 09:00   WBC 4.8 - 10.8 K/uL 10.2    RBC 4.20 - 5.40 M/uL 4.27    Hemoglobin 12.0 - 16.0 g/dL 13.3    Hematocrit 37.0 - 47.0 % 38.3    MCV 81.4 - 97.8 fL 89.7    MCH 27.0 - 33.0 pg 31.1    MCHC 32.2 - 35.5 g/dL 34.7    RDW 35.9 - 50.0 fL 46.7    Platelet Count 164 - 446 K/uL 279    MPV 9.0 - 12.9 fL 9.9    Neutrophils-Polys 44.00 - 72.00 % 74.70 (H)    Neutrophils (Absolute) 1.82 - 7.42 K/uL 7.60 (H)    Lymphocytes 22.00 - 41.00 % 17.80 (L)    Lymphs (Absolute) 1.00 - 4.80 K/uL 1.81    Monocytes 0.00 - 13.40 % 6.00    Monos (Absolute) 0.00 - 0.85 K/uL 0.61    Eosinophils 0.00 - 6.90 % 0.40    Eos (Absolute) 0.00 - 0.51 K/uL 0.04    Basophils 0.00 - 1.80 % 0.10    Baso (Absolute) 0.00 - 0.12 K/uL 0.01    Immature Granulocytes 0.00 - 0.90 % 1.00 (H)    Immature Granulocytes (abs) 0.00 - 0.11 K/uL 0.10    Nucleated RBC 0.00 - 0.20 /100 WBC 0.00    NRBC (Absolute) K/uL 0.00    Sodium 135 - 145 mmol/L 134 (L)    Potassium 3.6 - 5.5 mmol/L 4.1    Chloride 96 - 112 mmol/L 103    Co2 20 - 33 mmol/L 21    Anion Gap 7.0 - 16.0  10.0    Glucose 65 - 99 mg/dL 99    Bun 8 - 22 mg/dL 12    Creatinine 0.50 - 1.40 mg/dL 0.72    GFR (CKD-EPI) >60 mL/min/1.73 m 2 110    Calcium 8.5 - 10.5 mg/dL 9.5    Correct Calcium 8.5 - 10.5 mg/dL 10.1    AST(SGOT) 12 - 45 U/L 22    ALT(SGPT) 2 " - 50 U/L 18    Alkaline Phosphatase 30 - 99 U/L 104 (H)    Total Bilirubin 0.1 - 1.5 mg/dL 0.2    Albumin 3.2 - 4.9 g/dL 3.2    Total Protein 6.0 - 8.2 g/dL 6.3    Globulin 1.9 - 3.5 g/dL 3.1    A-G Ratio g/dL 1.0    Uric Acid 1.9 - 8.2 mg/dL 5.1    LDH Total 107 - 266 U/L 198    Creatinine, Random Urine mg/dL  48.38   Total Protein, Urine 0.0 - 15.0 mg/dL  9.4   Protein Creatinine Ratio 10 - 107 mg/g  194 (H)   (H): Data is abnormally high  (L): Data is abnormally low    A/P: 37 yo  at 39w4d.  IOL for AMA and gestational hypertension.  GBS negative.     Begin IV pitocin.  Ok for epidural.  AROM after epidural and place IUPC and FSE as needed.  Anticipate .  Prepare for PPH and shoulder dystocia.  Normal preeclampsia labs and elevated PC ratio.  Signed out to Dr. Hinson now.

## 2023-07-14 NOTE — H&P
"ADMISSION H AND P (DICTATED)    ADMISSION DIAGNOSIS:     IUP at 39w4d.  AMA.  GBS negative.  Gestational hypertension.  Morbid obesity - BMI of 47.    PLAN:     Admit to labor and delivery.  Cervical ripening with buccal cytotec.  Labor induction with IV pitocin when cervix is more favorable.  Ok for epidural.  AROM after epidural when comfortable for labor augmentation.  Anticipate .  Will sign out to Dr. Hinson later today.  Check preeclampsia labs.    /82   Pulse (!) 101   Temp 35.8 °C (96.5 °F) (Temporal)   Resp 16   Ht 1.651 m (5' 5\")   Wt (!) 129 kg (284 lb)   SpO2 97%     A, A, and O x 3 NAD    SVE: per my examination at 0650 1-2/50%/-3/posterior/moderate - deviated off to left    TOCO: occasional spontaneous contractions noted.    EFM: category I tracing.  Reactive and without decelerations.    Labs pending.    Vertex    EFW - 3300 grams    GBS negative on 2023.    A/P: 39 yo  at 39w4d based upon her LMP Of 10/10/2022 and consistent with a 9w4d u/s performed on 2022 and who presents for induction of labor secondary to AMA and gestational hypertension.     Admit to L and D.  Begin cervical ripening with buccal cytotec and then transition to IV pitocin.  Place IV and check preeclampsia labs.  Ok for epidural.  AROM after epidural.  Anticipate .  Will sign out to Dr. Hisnon later today.  "

## 2023-07-15 ENCOUNTER — ANESTHESIA EVENT (OUTPATIENT)
Dept: OBGYN | Facility: MEDICAL CENTER | Age: 38
End: 2023-07-15
Payer: COMMERCIAL

## 2023-07-15 ENCOUNTER — ANESTHESIA (OUTPATIENT)
Dept: OBGYN | Facility: MEDICAL CENTER | Age: 38
End: 2023-07-15
Payer: COMMERCIAL

## 2023-07-15 PROCEDURE — 700111 HCHG RX REV CODE 636 W/ 250 OVERRIDE (IP): Mod: JZ | Performed by: ANESTHESIOLOGY

## 2023-07-15 PROCEDURE — A9270 NON-COVERED ITEM OR SERVICE: HCPCS | Performed by: ANESTHESIOLOGY

## 2023-07-15 PROCEDURE — 700105 HCHG RX REV CODE 258: Mod: JZ | Performed by: OBSTETRICS & GYNECOLOGY

## 2023-07-15 PROCEDURE — 01968 ANES/ANALG CS DLVR NEURAXIAL: CPT | Performed by: ANESTHESIOLOGY

## 2023-07-15 PROCEDURE — 700111 HCHG RX REV CODE 636 W/ 250 OVERRIDE (IP): Performed by: ANESTHESIOLOGY

## 2023-07-15 PROCEDURE — 700111 HCHG RX REV CODE 636 W/ 250 OVERRIDE (IP): Performed by: OBSTETRICS & GYNECOLOGY

## 2023-07-15 PROCEDURE — 160035 HCHG PACU - 1ST 60 MINS PHASE I: Performed by: OBSTETRICS & GYNECOLOGY

## 2023-07-15 PROCEDURE — 770002 HCHG ROOM/CARE - OB PRIVATE (112)

## 2023-07-15 PROCEDURE — 700101 HCHG RX REV CODE 250: Performed by: ANESTHESIOLOGY

## 2023-07-15 PROCEDURE — 700102 HCHG RX REV CODE 250 W/ 637 OVERRIDE(OP): Performed by: ANESTHESIOLOGY

## 2023-07-15 PROCEDURE — 88307 TISSUE EXAM BY PATHOLOGIST: CPT

## 2023-07-15 PROCEDURE — 700105 HCHG RX REV CODE 258: Performed by: OBSTETRICS & GYNECOLOGY

## 2023-07-15 PROCEDURE — A9270 NON-COVERED ITEM OR SERVICE: HCPCS

## 2023-07-15 PROCEDURE — 160048 HCHG OR STATISTICAL LEVEL 1-5: Performed by: OBSTETRICS & GYNECOLOGY

## 2023-07-15 PROCEDURE — 10H07YZ INSERTION OF OTHER DEVICE INTO PRODUCTS OF CONCEPTION, VIA NATURAL OR ARTIFICIAL OPENING: ICD-10-PCS | Performed by: OBSTETRICS & GYNECOLOGY

## 2023-07-15 PROCEDURE — 700102 HCHG RX REV CODE 250 W/ 637 OVERRIDE(OP)

## 2023-07-15 PROCEDURE — 700111 HCHG RX REV CODE 636 W/ 250 OVERRIDE (IP): Mod: JZ

## 2023-07-15 PROCEDURE — 700105 HCHG RX REV CODE 258: Performed by: ANESTHESIOLOGY

## 2023-07-15 PROCEDURE — 160009 HCHG ANES TIME/MIN: Performed by: OBSTETRICS & GYNECOLOGY

## 2023-07-15 PROCEDURE — 160029 HCHG SURGERY MINUTES - 1ST 30 MINS LEVEL 4: Performed by: OBSTETRICS & GYNECOLOGY

## 2023-07-15 PROCEDURE — 160002 HCHG RECOVERY MINUTES (STAT): Performed by: OBSTETRICS & GYNECOLOGY

## 2023-07-15 PROCEDURE — 01967 NEURAXL LBR ANES VAG DLVR: CPT | Performed by: ANESTHESIOLOGY

## 2023-07-15 PROCEDURE — 59514 CESAREAN DELIVERY ONLY: CPT | Mod: 80 | Performed by: OBSTETRICS & GYNECOLOGY

## 2023-07-15 PROCEDURE — 160041 HCHG SURGERY MINUTES - EA ADDL 1 MIN LEVEL 4: Performed by: OBSTETRICS & GYNECOLOGY

## 2023-07-15 PROCEDURE — 303615 HCHG EPIDURAL/SPINAL ANESTHESIA FOR LABOR

## 2023-07-15 PROCEDURE — C1755 CATHETER, INTRASPINAL: HCPCS | Performed by: OBSTETRICS & GYNECOLOGY

## 2023-07-15 PROCEDURE — 10907ZC DRAINAGE OF AMNIOTIC FLUID, THERAPEUTIC FROM PRODUCTS OF CONCEPTION, VIA NATURAL OR ARTIFICIAL OPENING: ICD-10-PCS | Performed by: OBSTETRICS & GYNECOLOGY

## 2023-07-15 RX ORDER — SODIUM CHLORIDE, SODIUM GLUCONATE, SODIUM ACETATE, POTASSIUM CHLORIDE AND MAGNESIUM CHLORIDE 526; 502; 368; 37; 30 MG/100ML; MG/100ML; MG/100ML; MG/100ML; MG/100ML
1000 INJECTION, SOLUTION INTRAVENOUS ONCE
Status: COMPLETED | OUTPATIENT
Start: 2023-07-15 | End: 2023-07-15

## 2023-07-15 RX ORDER — DIPHENHYDRAMINE HYDROCHLORIDE 50 MG/ML
12.5 INJECTION INTRAMUSCULAR; INTRAVENOUS EVERY 6 HOURS PRN
Status: ACTIVE | OUTPATIENT
Start: 2023-07-15 | End: 2023-07-16

## 2023-07-15 RX ORDER — OXYCODONE HYDROCHLORIDE 5 MG/1
5 TABLET ORAL EVERY 4 HOURS PRN
Status: DISCONTINUED | OUTPATIENT
Start: 2023-07-16 | End: 2023-07-17 | Stop reason: HOSPADM

## 2023-07-15 RX ORDER — OXYCODONE HYDROCHLORIDE 5 MG/1
5 TABLET ORAL EVERY 4 HOURS PRN
Status: DISPENSED | OUTPATIENT
Start: 2023-07-15 | End: 2023-07-16

## 2023-07-15 RX ORDER — HYDROMORPHONE HYDROCHLORIDE 1 MG/ML
0.4 INJECTION, SOLUTION INTRAMUSCULAR; INTRAVENOUS; SUBCUTANEOUS
Status: DISCONTINUED | OUTPATIENT
Start: 2023-07-15 | End: 2023-07-15

## 2023-07-15 RX ORDER — ONDANSETRON 2 MG/ML
4 INJECTION INTRAMUSCULAR; INTRAVENOUS EVERY 6 HOURS PRN
Status: ACTIVE | OUTPATIENT
Start: 2023-07-15 | End: 2023-07-16

## 2023-07-15 RX ORDER — MIDAZOLAM HYDROCHLORIDE 1 MG/ML
1 INJECTION INTRAMUSCULAR; INTRAVENOUS
Status: DISCONTINUED | OUTPATIENT
Start: 2023-07-15 | End: 2023-07-15

## 2023-07-15 RX ORDER — ROPIVACAINE HYDROCHLORIDE 2 MG/ML
INJECTION, SOLUTION EPIDURAL; INFILTRATION; PERINEURAL CONTINUOUS
Status: DISCONTINUED | OUTPATIENT
Start: 2023-07-15 | End: 2023-07-16 | Stop reason: HOSPADM

## 2023-07-15 RX ORDER — EPHEDRINE SULFATE 50 MG/ML
10 INJECTION, SOLUTION INTRAVENOUS
Status: ACTIVE | OUTPATIENT
Start: 2023-07-15 | End: 2023-07-16

## 2023-07-15 RX ORDER — OXYCODONE HYDROCHLORIDE 10 MG/1
10 TABLET ORAL EVERY 4 HOURS PRN
Status: DISCONTINUED | OUTPATIENT
Start: 2023-07-16 | End: 2023-07-17 | Stop reason: HOSPADM

## 2023-07-15 RX ORDER — ONDANSETRON 2 MG/ML
4 INJECTION INTRAMUSCULAR; INTRAVENOUS EVERY 6 HOURS PRN
Status: DISCONTINUED | OUTPATIENT
Start: 2023-07-16 | End: 2023-07-17 | Stop reason: HOSPADM

## 2023-07-15 RX ORDER — OXYCODONE HCL 5 MG/5 ML
5 SOLUTION, ORAL ORAL
Status: DISCONTINUED | OUTPATIENT
Start: 2023-07-15 | End: 2023-07-15

## 2023-07-15 RX ORDER — KETOROLAC TROMETHAMINE 30 MG/ML
30 INJECTION, SOLUTION INTRAMUSCULAR; INTRAVENOUS EVERY 6 HOURS
Status: DISPENSED | OUTPATIENT
Start: 2023-07-16 | End: 2023-07-17

## 2023-07-15 RX ORDER — KETOROLAC TROMETHAMINE 30 MG/ML
INJECTION, SOLUTION INTRAMUSCULAR; INTRAVENOUS PRN
Status: DISCONTINUED | OUTPATIENT
Start: 2023-07-15 | End: 2023-07-15 | Stop reason: SURG

## 2023-07-15 RX ORDER — CALCIUM CARBONATE 500 MG/1
1000 TABLET, CHEWABLE ORAL EVERY 6 HOURS PRN
Status: DISCONTINUED | OUTPATIENT
Start: 2023-07-15 | End: 2023-07-17 | Stop reason: HOSPADM

## 2023-07-15 RX ORDER — DIPHENHYDRAMINE HCL 25 MG
25 TABLET ORAL EVERY 6 HOURS PRN
Status: DISCONTINUED | OUTPATIENT
Start: 2023-07-16 | End: 2023-07-17 | Stop reason: HOSPADM

## 2023-07-15 RX ORDER — MEPERIDINE HYDROCHLORIDE 25 MG/ML
12.5 INJECTION INTRAMUSCULAR; INTRAVENOUS; SUBCUTANEOUS
Status: DISCONTINUED | OUTPATIENT
Start: 2023-07-15 | End: 2023-07-15

## 2023-07-15 RX ORDER — OXYTOCIN 10 [USP'U]/ML
INJECTION, SOLUTION INTRAMUSCULAR; INTRAVENOUS PRN
Status: DISCONTINUED | OUTPATIENT
Start: 2023-07-15 | End: 2023-07-15 | Stop reason: SURG

## 2023-07-15 RX ORDER — HYDROMORPHONE HYDROCHLORIDE 1 MG/ML
0.4 INJECTION, SOLUTION INTRAMUSCULAR; INTRAVENOUS; SUBCUTANEOUS
Status: ACTIVE | OUTPATIENT
Start: 2023-07-15 | End: 2023-07-16

## 2023-07-15 RX ORDER — DIPHENHYDRAMINE HYDROCHLORIDE 50 MG/ML
25 INJECTION INTRAMUSCULAR; INTRAVENOUS EVERY 6 HOURS PRN
Status: ACTIVE | OUTPATIENT
Start: 2023-07-15 | End: 2023-07-16

## 2023-07-15 RX ORDER — ONDANSETRON 4 MG/1
4 TABLET, ORALLY DISINTEGRATING ORAL EVERY 6 HOURS PRN
Status: DISCONTINUED | OUTPATIENT
Start: 2023-07-16 | End: 2023-07-17 | Stop reason: HOSPADM

## 2023-07-15 RX ORDER — ROPIVACAINE HYDROCHLORIDE 2 MG/ML
INJECTION, SOLUTION EPIDURAL; INFILTRATION; PERINEURAL
Status: COMPLETED
Start: 2023-07-15 | End: 2023-07-15

## 2023-07-15 RX ORDER — HALOPERIDOL 5 MG/ML
1 INJECTION INTRAMUSCULAR
Status: DISCONTINUED | OUTPATIENT
Start: 2023-07-15 | End: 2023-07-15

## 2023-07-15 RX ORDER — DIPHENHYDRAMINE HYDROCHLORIDE 50 MG/ML
25 INJECTION INTRAMUSCULAR; INTRAVENOUS EVERY 6 HOURS PRN
Status: DISCONTINUED | OUTPATIENT
Start: 2023-07-16 | End: 2023-07-17 | Stop reason: HOSPADM

## 2023-07-15 RX ORDER — SODIUM CHLORIDE, SODIUM LACTATE, POTASSIUM CHLORIDE, CALCIUM CHLORIDE 600; 310; 30; 20 MG/100ML; MG/100ML; MG/100ML; MG/100ML
INJECTION, SOLUTION INTRAVENOUS PRN
Status: DISCONTINUED | OUTPATIENT
Start: 2023-07-15 | End: 2023-07-17 | Stop reason: HOSPADM

## 2023-07-15 RX ORDER — IBUPROFEN 800 MG/1
800 TABLET ORAL EVERY 8 HOURS
Status: DISCONTINUED | OUTPATIENT
Start: 2023-07-17 | End: 2023-07-17 | Stop reason: HOSPADM

## 2023-07-15 RX ORDER — HYDROMORPHONE HYDROCHLORIDE 1 MG/ML
0.1 INJECTION, SOLUTION INTRAMUSCULAR; INTRAVENOUS; SUBCUTANEOUS
Status: DISCONTINUED | OUTPATIENT
Start: 2023-07-15 | End: 2023-07-15

## 2023-07-15 RX ORDER — DIPHENHYDRAMINE HYDROCHLORIDE 50 MG/ML
12.5 INJECTION INTRAMUSCULAR; INTRAVENOUS
Status: DISCONTINUED | OUTPATIENT
Start: 2023-07-15 | End: 2023-07-15

## 2023-07-15 RX ORDER — SODIUM CHLORIDE, SODIUM LACTATE, POTASSIUM CHLORIDE, AND CALCIUM CHLORIDE .6; .31; .03; .02 G/100ML; G/100ML; G/100ML; G/100ML
250 INJECTION, SOLUTION INTRAVENOUS PRN
Status: DISCONTINUED | OUTPATIENT
Start: 2023-07-15 | End: 2023-07-15 | Stop reason: HOSPADM

## 2023-07-15 RX ORDER — HYDROMORPHONE HYDROCHLORIDE 1 MG/ML
0.2 INJECTION, SOLUTION INTRAMUSCULAR; INTRAVENOUS; SUBCUTANEOUS
Status: ACTIVE | OUTPATIENT
Start: 2023-07-15 | End: 2023-07-16

## 2023-07-15 RX ORDER — ENOXAPARIN SODIUM 100 MG/ML
40 INJECTION SUBCUTANEOUS DAILY
Status: DISCONTINUED | OUTPATIENT
Start: 2023-07-16 | End: 2023-07-17 | Stop reason: HOSPADM

## 2023-07-15 RX ORDER — IBUPROFEN 800 MG/1
800 TABLET ORAL EVERY 8 HOURS PRN
Status: DISCONTINUED | OUTPATIENT
Start: 2023-07-20 | End: 2023-07-17 | Stop reason: HOSPADM

## 2023-07-15 RX ORDER — SODIUM CHLORIDE, SODIUM LACTATE, POTASSIUM CHLORIDE, CALCIUM CHLORIDE 600; 310; 30; 20 MG/100ML; MG/100ML; MG/100ML; MG/100ML
INJECTION, SOLUTION INTRAVENOUS CONTINUOUS
Status: DISCONTINUED | OUTPATIENT
Start: 2023-07-15 | End: 2023-07-15

## 2023-07-15 RX ORDER — ACETAMINOPHEN 500 MG
1000 TABLET ORAL EVERY 6 HOURS PRN
Status: DISCONTINUED | OUTPATIENT
Start: 2023-07-19 | End: 2023-07-17 | Stop reason: HOSPADM

## 2023-07-15 RX ORDER — BUPIVACAINE HYDROCHLORIDE 2.5 MG/ML
INJECTION, SOLUTION EPIDURAL; INFILTRATION; INTRACAUDAL
Status: COMPLETED
Start: 2023-07-15 | End: 2023-07-15

## 2023-07-15 RX ORDER — SODIUM CHLORIDE, SODIUM LACTATE, POTASSIUM CHLORIDE, AND CALCIUM CHLORIDE .6; .31; .03; .02 G/100ML; G/100ML; G/100ML; G/100ML
1000 INJECTION, SOLUTION INTRAVENOUS
Status: DISCONTINUED | OUTPATIENT
Start: 2023-07-15 | End: 2023-07-15 | Stop reason: HOSPADM

## 2023-07-15 RX ORDER — DOCUSATE SODIUM 100 MG/1
100 CAPSULE, LIQUID FILLED ORAL 2 TIMES DAILY PRN
Status: DISCONTINUED | OUTPATIENT
Start: 2023-07-15 | End: 2023-07-17 | Stop reason: HOSPADM

## 2023-07-15 RX ORDER — SIMETHICONE 125 MG
125 TABLET,CHEWABLE ORAL 4 TIMES DAILY PRN
Status: DISCONTINUED | OUTPATIENT
Start: 2023-07-15 | End: 2023-07-17 | Stop reason: HOSPADM

## 2023-07-15 RX ORDER — CITRIC ACID/SODIUM CITRATE 334-500MG
30 SOLUTION, ORAL ORAL ONCE
Status: COMPLETED | OUTPATIENT
Start: 2023-07-15 | End: 2023-07-15

## 2023-07-15 RX ORDER — CEFAZOLIN SODIUM 1 G/3ML
INJECTION, POWDER, FOR SOLUTION INTRAMUSCULAR; INTRAVENOUS PRN
Status: DISCONTINUED | OUTPATIENT
Start: 2023-07-15 | End: 2023-07-15 | Stop reason: SURG

## 2023-07-15 RX ORDER — HYDROMORPHONE HYDROCHLORIDE 1 MG/ML
0.2 INJECTION, SOLUTION INTRAMUSCULAR; INTRAVENOUS; SUBCUTANEOUS
Status: DISCONTINUED | OUTPATIENT
Start: 2023-07-15 | End: 2023-07-15

## 2023-07-15 RX ORDER — BISACODYL 10 MG
10 SUPPOSITORY, RECTAL RECTAL PRN
Status: DISCONTINUED | OUTPATIENT
Start: 2023-07-15 | End: 2023-07-17 | Stop reason: HOSPADM

## 2023-07-15 RX ORDER — METOCLOPRAMIDE HYDROCHLORIDE 5 MG/ML
10 INJECTION INTRAMUSCULAR; INTRAVENOUS ONCE
Status: COMPLETED | OUTPATIENT
Start: 2023-07-15 | End: 2023-07-15

## 2023-07-15 RX ORDER — CITRIC ACID/SODIUM CITRATE 334-500MG
SOLUTION, ORAL ORAL
Status: COMPLETED
Start: 2023-07-15 | End: 2023-07-15

## 2023-07-15 RX ORDER — METOCLOPRAMIDE HYDROCHLORIDE 5 MG/ML
10 INJECTION INTRAMUSCULAR; INTRAVENOUS ONCE
Status: DISCONTINUED | OUTPATIENT
Start: 2023-07-15 | End: 2023-07-15 | Stop reason: HOSPADM

## 2023-07-15 RX ORDER — ACETAMINOPHEN 500 MG
1000 TABLET ORAL EVERY 6 HOURS
Status: COMPLETED | OUTPATIENT
Start: 2023-07-15 | End: 2023-07-16

## 2023-07-15 RX ORDER — EPHEDRINE SULFATE 50 MG/ML
5 INJECTION, SOLUTION INTRAVENOUS
Status: DISCONTINUED | OUTPATIENT
Start: 2023-07-15 | End: 2023-07-15 | Stop reason: HOSPADM

## 2023-07-15 RX ORDER — ACETAMINOPHEN 500 MG
1000 TABLET ORAL EVERY 6 HOURS
Status: DISCONTINUED | OUTPATIENT
Start: 2023-07-16 | End: 2023-07-17 | Stop reason: HOSPADM

## 2023-07-15 RX ORDER — ONDANSETRON 2 MG/ML
4 INJECTION INTRAMUSCULAR; INTRAVENOUS
Status: DISCONTINUED | OUTPATIENT
Start: 2023-07-15 | End: 2023-07-15

## 2023-07-15 RX ORDER — VITAMIN A ACETATE, BETA CAROTENE, ASCORBIC ACID, CHOLECALCIFEROL, .ALPHA.-TOCOPHEROL ACETATE, DL-, THIAMINE MONONITRATE, RIBOFLAVIN, NIACINAMIDE, PYRIDOXINE HYDROCHLORIDE, FOLIC ACID, CYANOCOBALAMIN, CALCIUM CARBONATE, FERROUS FUMARATE, ZINC OXIDE, CUPRIC OXIDE 3080; 12; 120; 400; 1; 1.84; 3; 20; 22; 920; 25; 200; 27; 10; 2 [IU]/1; UG/1; MG/1; [IU]/1; MG/1; MG/1; MG/1; MG/1; MG/1; [IU]/1; MG/1; MG/1; MG/1; MG/1; MG/1
1 TABLET, FILM COATED ORAL
Status: DISCONTINUED | OUTPATIENT
Start: 2023-07-16 | End: 2023-07-17 | Stop reason: HOSPADM

## 2023-07-15 RX ORDER — AZITHROMYCIN 500 MG/5ML
500 INJECTION, POWDER, LYOPHILIZED, FOR SOLUTION INTRAVENOUS EVERY 24 HOURS
Status: COMPLETED | OUTPATIENT
Start: 2023-07-15 | End: 2023-07-15

## 2023-07-15 RX ORDER — MORPHINE SULFATE 0.5 MG/ML
INJECTION, SOLUTION EPIDURAL; INTRATHECAL; INTRAVENOUS PRN
Status: DISCONTINUED | OUTPATIENT
Start: 2023-07-15 | End: 2023-07-15 | Stop reason: SURG

## 2023-07-15 RX ORDER — BUPIVACAINE HYDROCHLORIDE 5 MG/ML
INJECTION, SOLUTION EPIDURAL; INTRACAUDAL PRN
Status: DISCONTINUED | OUTPATIENT
Start: 2023-07-15 | End: 2023-07-15 | Stop reason: SURG

## 2023-07-15 RX ORDER — OXYCODONE HYDROCHLORIDE 10 MG/1
10 TABLET ORAL EVERY 4 HOURS PRN
Status: ACTIVE | OUTPATIENT
Start: 2023-07-15 | End: 2023-07-16

## 2023-07-15 RX ORDER — BUPIVACAINE HYDROCHLORIDE 2.5 MG/ML
INJECTION, SOLUTION EPIDURAL; INFILTRATION; INTRACAUDAL
Status: COMPLETED | OUTPATIENT
Start: 2023-07-15 | End: 2023-07-15

## 2023-07-15 RX ORDER — LIDOCAINE HYDROCHLORIDE AND EPINEPHRINE 15; 5 MG/ML; UG/ML
INJECTION, SOLUTION EPIDURAL
Status: COMPLETED | OUTPATIENT
Start: 2023-07-15 | End: 2023-07-15

## 2023-07-15 RX ORDER — MORPHINE SULFATE 2 MG/ML
INJECTION, SOLUTION INTRAMUSCULAR; INTRAVENOUS PRN
Status: DISCONTINUED | OUTPATIENT
Start: 2023-07-15 | End: 2023-07-15 | Stop reason: SURG

## 2023-07-15 RX ORDER — OXYCODONE HCL 5 MG/5 ML
10 SOLUTION, ORAL ORAL
Status: DISCONTINUED | OUTPATIENT
Start: 2023-07-15 | End: 2023-07-15

## 2023-07-15 RX ORDER — SODIUM CHLORIDE, SODIUM GLUCONATE, SODIUM ACETATE, POTASSIUM CHLORIDE AND MAGNESIUM CHLORIDE 526; 502; 368; 37; 30 MG/100ML; MG/100ML; MG/100ML; MG/100ML; MG/100ML
INJECTION, SOLUTION INTRAVENOUS
Status: DISCONTINUED | OUTPATIENT
Start: 2023-07-15 | End: 2023-07-15 | Stop reason: SURG

## 2023-07-15 RX ORDER — DEXTROSE, SODIUM CHLORIDE, SODIUM LACTATE, POTASSIUM CHLORIDE, AND CALCIUM CHLORIDE 5; .6; .31; .03; .02 G/100ML; G/100ML; G/100ML; G/100ML; G/100ML
INJECTION, SOLUTION INTRAVENOUS CONTINUOUS
Status: DISCONTINUED | OUTPATIENT
Start: 2023-07-15 | End: 2023-07-16 | Stop reason: HOSPADM

## 2023-07-15 RX ADMIN — MORPHINE SULFATE 1.5 MG: 0.5 INJECTION, SOLUTION EPIDURAL; INTRATHECAL; INTRAVENOUS at 19:54

## 2023-07-15 RX ADMIN — MORPHINE SULFATE 2 MG: 2 INJECTION, SOLUTION INTRAMUSCULAR; INTRAVENOUS at 19:56

## 2023-07-15 RX ADMIN — ROPIVACAINE HYDROCHLORIDE: 2 INJECTION, SOLUTION EPIDURAL; INFILTRATION at 04:27

## 2023-07-15 RX ADMIN — ACETAMINOPHEN 1000 MG: 500 TABLET, FILM COATED ORAL at 23:34

## 2023-07-15 RX ADMIN — BUPIVACAINE HYDROCHLORIDE 4 ML: 5 INJECTION, SOLUTION EPIDURAL; INTRACAUDAL at 19:44

## 2023-07-15 RX ADMIN — ROPIVACAINE HYDROCHLORIDE: 2 INJECTION, SOLUTION EPIDURAL; INFILTRATION at 15:51

## 2023-07-15 RX ADMIN — BUPIVACAINE HYDROCHLORIDE 8 ML: 2.5 INJECTION, SOLUTION EPIDURAL; INFILTRATION; INTRACAUDAL at 04:35

## 2023-07-15 RX ADMIN — FENTANYL CITRATE 100 MCG: 50 INJECTION, SOLUTION INTRAMUSCULAR; INTRAVENOUS at 04:35

## 2023-07-15 RX ADMIN — OXYTOCIN 18 MILLI-UNITS/MIN: 10 INJECTION, SOLUTION INTRAMUSCULAR; INTRAVENOUS at 06:49

## 2023-07-15 RX ADMIN — SODIUM CHLORIDE, POTASSIUM CHLORIDE, SODIUM LACTATE AND CALCIUM CHLORIDE: 600; 310; 30; 20 INJECTION, SOLUTION INTRAVENOUS at 05:08

## 2023-07-15 RX ADMIN — SODIUM BICARBONATE 1 MEQ: 84 INJECTION, SOLUTION INTRAVENOUS at 19:23

## 2023-07-15 RX ADMIN — SODIUM CHLORIDE, SODIUM GLUCONATE, SODIUM ACETATE, POTASSIUM CHLORIDE AND MAGNESIUM CHLORIDE 1000 ML: 526; 502; 368; 37; 30 INJECTION, SOLUTION INTRAVENOUS at 18:50

## 2023-07-15 RX ADMIN — BUPIVACAINE HYDROCHLORIDE 10 ML: 5 INJECTION, SOLUTION EPIDURAL; INTRACAUDAL at 19:23

## 2023-07-15 RX ADMIN — SODIUM CHLORIDE, SODIUM LACTATE, POTASSIUM CHLORIDE, CALCIUM CHLORIDE AND DEXTROSE MONOHYDRATE: 5; 600; 310; 30; 20 INJECTION, SOLUTION INTRAVENOUS at 06:47

## 2023-07-15 RX ADMIN — Medication 30 ML: at 19:05

## 2023-07-15 RX ADMIN — FENTANYL CITRATE 100 MCG: 50 INJECTION, SOLUTION INTRAMUSCULAR; INTRAVENOUS at 19:44

## 2023-07-15 RX ADMIN — KETOROLAC TROMETHAMINE 30 MG: 30 INJECTION, SOLUTION INTRAMUSCULAR; INTRAVENOUS at 20:23

## 2023-07-15 RX ADMIN — METOCLOPRAMIDE 10 MG: 5 INJECTION, SOLUTION INTRAMUSCULAR; INTRAVENOUS at 19:06

## 2023-07-15 RX ADMIN — LIDOCAINE HYDROCHLORIDE,EPINEPHRINE BITARTRATE 3 ML: 15; .005 INJECTION, SOLUTION EPIDURAL; INFILTRATION; INTRACAUDAL; PERINEURAL at 04:30

## 2023-07-15 RX ADMIN — SODIUM CHLORIDE, POTASSIUM CHLORIDE, SODIUM LACTATE AND CALCIUM CHLORIDE: 600; 310; 30; 20 INJECTION, SOLUTION INTRAVENOUS at 01:46

## 2023-07-15 RX ADMIN — FAMOTIDINE 20 MG: 10 INJECTION, SOLUTION INTRAVENOUS at 19:07

## 2023-07-15 RX ADMIN — OXYTOCIN 125 ML/HR: 10 INJECTION, SOLUTION INTRAMUSCULAR; INTRAVENOUS at 21:08

## 2023-07-15 RX ADMIN — OXYTOCIN 20 UNITS: 10 INJECTION, SOLUTION INTRAMUSCULAR; INTRAVENOUS at 19:52

## 2023-07-15 RX ADMIN — SODIUM CITRATE AND CITRIC ACID MONOHYDRATE 30 ML: 500; 334 SOLUTION ORAL at 19:05

## 2023-07-15 RX ADMIN — CEFAZOLIN 3 G: 1 INJECTION, POWDER, FOR SOLUTION INTRAMUSCULAR; INTRAVENOUS at 19:30

## 2023-07-15 RX ADMIN — SODIUM CHLORIDE, SODIUM GLUCONATE, SODIUM ACETATE, POTASSIUM CHLORIDE AND MAGNESIUM CHLORIDE: 526; 502; 368; 37; 30 INJECTION, SOLUTION INTRAVENOUS at 04:13

## 2023-07-15 RX ADMIN — AZITHROMYCIN FOR INJECTION INJECTION, POWDER, LYOPHILIZED, FOR SOLUTION 500 MG: 500 INJECTION INTRAVENOUS at 19:05

## 2023-07-15 ASSESSMENT — PAIN DESCRIPTION - PAIN TYPE
TYPE: ACUTE PAIN
TYPE: ACUTE PAIN

## 2023-07-15 ASSESSMENT — PAIN SCALES - GENERAL: PAIN_LEVEL: 0

## 2023-07-15 NOTE — PROGRESS NOTES
"S:  Pt rating contractions at 5/10. Declines anything for BC at this time    O: VSS - all BP WNL  FHTs 140s CAt 1 [place]: Q 2-4  Cx: 4/50/-3  Pitocin 12  Labs WNL    A/P:  IUP at 39 4/7 weeks, IOL for AMA and  \"borderline\" gestational HTN - doing well   Labor:  Unable to safely AROM and place internal  monitors.  Continue Pitocin and position changes.  Recheck in 2 hours   FWB: Reassuring  \" Borderline\" Gestational HTN:  No indication for BP medications at this time   "

## 2023-07-15 NOTE — PROGRESS NOTES
S:  Pt doing well.  No C/O pressure    O: VSS - all BP WNL  FHTs 150s CAt 1  [place] Q 2  Cx: 6-7/100/-1  Pit 18    A/P:  IUP at 39 5/7 weeks, IOL for AMA and borderline gestational HTN - doing well   Labor:  Has made change. Recheck in 2 hours  FWB: overall reassuring  Borderline gestational HTN - no elevated BP

## 2023-07-15 NOTE — ANESTHESIA PREPROCEDURE EVALUATION
Date: 07/15/23  Procedure: Labor Epidural         Relevant Problems   Other   (positive) BMI 45.0-49.9, adult (HCC)   (positive) Cigarette nicotine dependence without complication   (positive) Prediabetes     Anes H&P:  PAST MEDICAL HISTORY:   38 y.o. female who presents for .  She has current and past medical problems significant for:    Patient denies history of seizures or strokes  Patient denies history of diabetes or thyroid disease  Patient denies history of Asthma or COPD  Patient denies history of GERD or esophageal disease  Patient denies history of FRAN  Patient denies history of renal failure  Patient denies history of bleeding disorders  Patient denies history of upper or lower extremity motor/sensory deficits       Patient denies history of previous MI, chest pain or shortness of breath  Able to climb 2 flights of stair without dyspnea or angina, > 4 METs     Patient denies history of complications with anesthesia    Past Medical History:   Diagnosis Date   • Allergy     seasonal   • Asthma    • Chronic bronchitis (HCC)        SMOKING/ALCOHOL/RECREATIONAL DRUG USE:  Social History     Tobacco Use   • Smoking status: Some Days     Packs/day: 0.25     Years: 12.00     Pack years: 3.00     Types: Cigarettes   • Smokeless tobacco: Never   Vaping Use   • Vaping Use: Never used   Substance Use Topics   • Alcohol use: Yes     Alcohol/week: 1.2 - 1.8 oz     Types: 2 - 3 Glasses of wine per week     Comment: RARE   • Drug use: No     Social History     Substance and Sexual Activity   Drug Use No       PAST SURGICAL HISTORY:  Past Surgical History:   Procedure Laterality Date   • DILATION AND EVACUATION N/A 4/3/2021    Procedure: DILATION AND EVACUATION, UTERUS;  Surgeon: Corinne E Capurro, M.D.;  Location: [facility];  Service: Gynecology   • DENTAL EXTRACTION(S)         ALLERGIES:   No Known Allergies    MEDICATIONS:  No current facility-administered medications on file prior to encounter.      Current Outpatient Medications on File Prior to Encounter   Medication Sig Dispense Refill   • Prenatal Vit-Fe Fumarate-FA (PRENATAL 1+1 PO) Take  by mouth.     • albuterol 108 (90 Base) MCG/ACT Aero Soln inhalation aerosol Inhale 2 Puffs every four hours as needed (cough, wheeze). 1 Each 1       LABS:  Recent Labs     07/14/23  0740   WBC 10.2   RBC 4.27   HEMOGLOBIN 13.3   HEMATOCRIT 38.3   MCV 89.7   MCH 31.1   RDW 46.7   PLATELETCT 279   MPV 9.9   NEUTSPOLYS 74.70*   LYMPHOCYTES 17.80*   MONOCYTES 6.00   EOSINOPHILS 0.40   BASOPHILS 0.10      Lab Results   Component Value Date/Time    SODIUM 134 (L) 07/14/2023 0740    POTASSIUM 4.1 07/14/2023 0740    CHLORIDE 103 07/14/2023 0740    CO2 21 07/14/2023 0740    GLUCOSE 99 07/14/2023 0740    BUN 12 07/14/2023 0740    CALCIUM 9.5 07/14/2023 0740       No results found for this or any previous visit.       PREVIOUS ANESTHETICS: See EMR  __________________________________________      Physical Exam    Airway   Mallampati: III  TM distance: >3 FB  Neck ROM: full       Cardiovascular - normal exam  Rhythm: regular  Rate: normal  (-) murmur     Dental - normal exam           Pulmonary - normal exam  Breath sounds clear to auscultation     Abdominal    Neurological - normal exam         Other findings: Unable to palpate spine            Anesthesia Plan    ASA 3   ASA physical status 3 criteria: morbid obesity - BMI greater than or equal to 40    Plan - epidural   Neuraxial block will be labor analgesia                  Pertinent diagnostic labs and testing reviewed    Informed Consent:    Anesthetic plan and risks discussed with patient.

## 2023-07-15 NOTE — ANESTHESIA PROCEDURE NOTES
Epidural Block    Date/Time: 7/15/2023 4:13 AM    Performed by: Pierre Kenny M.D.  Authorized by: Pierre Kneny M.D.    Patient Location:  OB  Start Time:  7/15/2023 4:13 AM  End Time:  7/15/2023 4:35 AM  Reason for Block: labor analgesia    patient identified, IV checked, site marked, risks and benefits discussed, surgical consent, monitors and equipment checked, pre-op evaluation, timeout performed and at patient's request    Patient Position:  Sitting  Prep: Betadine, patient draped and sterile technique    Monitoring:  Blood pressure, continuous pulse oximetry and heart rate  Approach:  Midline  Location:  L2-L3  Injection Technique:  ROSELYN air  Skin infiltration:  Lidocaine  Strength:  1%  Dose:  3ml  Needle Type:  Tuohy  Needle Gauge:  17 G  Needle Length:  3.5 in  Loss of resistance::  8  Catheter Size:  19 G  Catheter at Skin Depth:  18  Test Dose Result:  Negative

## 2023-07-15 NOTE — PROGRESS NOTES
S:  Pt comfortable    O: VSS - afebrile  FHTs 140s Cat 1  [place]  3  Cx: 5/100/-2  Pit 10    A/P:   IUP at 39 5/7 weeks -- IOL  for AMA and borderline gestational HTN - fair   Labor:  Minimal change, but MVU not adequate. Continue Pitocin and position changed. Recheck in 2 hours.  If unchanged, consider C/S  FWB: FSE applied  Borderline gestational HTN - no elevated BP

## 2023-07-15 NOTE — PROGRESS NOTES
Pt received at shift change.  Pt was feeling numb in her chest area.  Pt semi-fowlers.  Pt states feeling less numb at this time.  Baby having some tachysytole.  RN decreased pitocin from 18mu to 6mu.  Isaac catheter placed at 0720.  Dr. Hinson contacted by RN and notified of pitocin change.      0735 - Dr. Hinson in room to assess patient.  AROM performed and IUPC placed.      0800 - Pt left lateral with peanut ball    1140 - Dr. Hinson in room to assess patient  SVE remains unchanged.  Continue to increase pitocin    1144 - FSE placed by Dr. Hinson  RN adjusting monitor and patient.      1150 - FSE artifact  FSE readjusted    1206 - FSE monitor cord replaced    1417 - Dr. Hinson notified of prolonged deceleration.  Pitocin off at this time.  Pt repositioned.  Return to baseline achieved.  Pt in a right tilt low fowlers position at this time.      1820 - Dr. Hinson in room to assess patient.  Pt with emesis and shaking.  Pt's temp has increased 1 degree over the past hour.  POC discussed with patient.  Patient and  discussing POC.  Pt and  both agreeable to  section.      184 - Pt clipped.  Bedside report to Mari BARGER.  Pt being prepped for  section.

## 2023-07-15 NOTE — PROGRESS NOTES
S:  Pt comfortable with epidural. Discussed AROM and IUPC placement - pt agrees    O: VSS - afebrile  FHTs 130s Cat 1-2  [place] Q5  Cx: 5/90-2 AROM clear  Pit 9    A/P:  IUP at 39 5/7 weeks, IOL for AMA and borderline gestational HTN - doing well   Labor:  IUPC placed.  Adjust pitocin to achieve adequate MVU  FWB: overall reassuring  Borderline gestational HTN - no medications required

## 2023-07-15 NOTE — PROGRESS NOTES
Pt feeling more pressure and pain with contractions.  RN checked her and exam was unchanged.  Discussed management options.  Pt will accept epidural placement    All BP WNL  Pitocin 18  FHTs 140s Cat 1  [place] Q 4    Recheck after epidural and attempt AROM with IUPC and FSE placement

## 2023-07-15 NOTE — PROGRESS NOTES
S:  Pt denies any pain or pressure    O:  VSS - all BP WNL  FHTS 150s Cat 1-2.  Did have a decel to the 90s for 5 minutes which resolved with position  change and D/C Pit  [place] Q 4  Cx: 8/100/-1 with caput  Pit 10    A/P  IUP at 39 5/7 weeks, IOL for AMA and borderline gestational HTN - doing well  Labor:  Making slow change.  Continue Pitocin and recheck in 2 hours  FWB: Overall reassuring  Borderline gestational HTN:  No elevated BP readings

## 2023-07-15 NOTE — PROGRESS NOTES
S:  Pt comfortable    O: VSS - afebrile  FHTs 150s Cat 1  [place]: Q3  Cx: 5/100/-2  Pit 6    A/P;  IUP at 39 5/7 weeks, IOL for AMA and Borderline gestational HTN - fair   Labor:  Minimal change.  Continue Pitocin and position changes.  Recheck in 2 hours  FWB: overall reassuring  Borderline gestational HTN - has no required any antihypertensive medications

## 2023-07-15 NOTE — PROGRESS NOTES
"1900 - Report received from CHARBEL Causey at bedside. Pt FOB \"Jay\" and sister at BS for support. Pt comfortable with contractions. Pt requesting support for a \"natural\" birth at this time and is undecided on an epidural. Education on pain management and POC reinforced. All pt questions addressed at this time.  2328 - Dr. Hinson aware FHT and contractions are difficult to monitor. EFM equipment replaced and monitors readjusted often throughout shift. SVE: 4/50/-2. Internal monitors cannot be applied at this time per MD.  0544 - Pt reports numbness in breasts and periorbital regions after epidural. Dr. Kenny to BS to assess pt with ice. Pt repositioned to high fowlers. Orders received to turn ropivacaine off for 30 min, restart at 6mL/hr.  0700 - Report given to CHARBEL Burdick at BS. Pt has no new questions or concerns at this time.  "

## 2023-07-15 NOTE — CARE PLAN
The patient is Stable - Low risk of patient condition declining or worsening    Shift Goals  Clinical Goals: make cervical change  Patient Goals: have a healthy baby  Family Goals: emotional support/ comfort management      Problem: Knowledge Deficit - L&D  Goal: Patient and family/caregivers will demonstrate understanding of plan of care, disease process/condition, diagnostic tests and medications  Outcome: Progressing   POC discussed with pt. Pt verbalizes understanding and asks questions as needed.     Problem: Risk for Excess Fluid Volume  Goal: Patient will demonstrate pulse, blood pressure and neurologic signs within expected ranges and without any respiratory complications  Outcome: Progressing   Monitoring BP and I/O's closely. Assessed for edema to establish a baseline. Will continue to assess for changes in respiratory function.      Problem: Pain  Goal: Patient's pain will be alleviated or reduced to the patient’s comfort goal  Outcome: Progressing   Pain management options discussed with pt. Pt able to verbalize a pain rating on pain scale. Pt will ask for intervention as needed.

## 2023-07-16 LAB
ERYTHROCYTE [DISTWIDTH] IN BLOOD BY AUTOMATED COUNT: 48.9 FL (ref 35.9–50)
HCT VFR BLD AUTO: 32.7 % (ref 37–47)
HGB BLD-MCNC: 11 G/DL (ref 12–16)
MCH RBC QN AUTO: 30.9 PG (ref 27–33)
MCHC RBC AUTO-ENTMCNC: 33.6 G/DL (ref 32.2–35.5)
MCV RBC AUTO: 91.9 FL (ref 81.4–97.8)
PLATELET # BLD AUTO: 208 K/UL (ref 164–446)
PMV BLD AUTO: 9.9 FL (ref 9–12.9)
RBC # BLD AUTO: 3.56 M/UL (ref 4.2–5.4)
WBC # BLD AUTO: 17.1 K/UL (ref 4.8–10.8)

## 2023-07-16 PROCEDURE — 700111 HCHG RX REV CODE 636 W/ 250 OVERRIDE (IP): Mod: JZ | Performed by: OBSTETRICS & GYNECOLOGY

## 2023-07-16 PROCEDURE — 700102 HCHG RX REV CODE 250 W/ 637 OVERRIDE(OP): Performed by: OBSTETRICS & GYNECOLOGY

## 2023-07-16 PROCEDURE — 770002 HCHG ROOM/CARE - OB PRIVATE (112)

## 2023-07-16 PROCEDURE — 700102 HCHG RX REV CODE 250 W/ 637 OVERRIDE(OP): Performed by: ANESTHESIOLOGY

## 2023-07-16 PROCEDURE — A9270 NON-COVERED ITEM OR SERVICE: HCPCS | Performed by: OBSTETRICS & GYNECOLOGY

## 2023-07-16 PROCEDURE — 85027 COMPLETE CBC AUTOMATED: CPT

## 2023-07-16 PROCEDURE — 36415 COLL VENOUS BLD VENIPUNCTURE: CPT

## 2023-07-16 PROCEDURE — 700111 HCHG RX REV CODE 636 W/ 250 OVERRIDE (IP): Mod: JZ | Performed by: ANESTHESIOLOGY

## 2023-07-16 PROCEDURE — A9270 NON-COVERED ITEM OR SERVICE: HCPCS | Performed by: ANESTHESIOLOGY

## 2023-07-16 PROCEDURE — 700105 HCHG RX REV CODE 258: Mod: JZ | Performed by: OBSTETRICS & GYNECOLOGY

## 2023-07-16 RX ORDER — SODIUM CHLORIDE, SODIUM LACTATE, POTASSIUM CHLORIDE, AND CALCIUM CHLORIDE .6; .31; .03; .02 G/100ML; G/100ML; G/100ML; G/100ML
500 INJECTION, SOLUTION INTRAVENOUS ONCE
Status: COMPLETED | OUTPATIENT
Start: 2023-07-16 | End: 2023-07-16

## 2023-07-16 RX ADMIN — DOCUSATE SODIUM 100 MG: 100 CAPSULE, LIQUID FILLED ORAL at 21:24

## 2023-07-16 RX ADMIN — ENOXAPARIN SODIUM 40 MG: 100 INJECTION SUBCUTANEOUS at 08:02

## 2023-07-16 RX ADMIN — KETOROLAC TROMETHAMINE 30 MG: 30 INJECTION, SOLUTION INTRAMUSCULAR; INTRAVENOUS at 08:03

## 2023-07-16 RX ADMIN — ACETAMINOPHEN 1000 MG: 500 TABLET, FILM COATED ORAL at 18:17

## 2023-07-16 RX ADMIN — OXYCODONE HYDROCHLORIDE 5 MG: 5 TABLET ORAL at 22:07

## 2023-07-16 RX ADMIN — SODIUM CHLORIDE, POTASSIUM CHLORIDE, SODIUM LACTATE AND CALCIUM CHLORIDE 500 ML: 600; 310; 30; 20 INJECTION, SOLUTION INTRAVENOUS at 02:57

## 2023-07-16 RX ADMIN — ACETAMINOPHEN 1000 MG: 500 TABLET, FILM COATED ORAL at 06:06

## 2023-07-16 RX ADMIN — KETOROLAC TROMETHAMINE 30 MG: 30 INJECTION, SOLUTION INTRAMUSCULAR; INTRAVENOUS at 21:24

## 2023-07-16 RX ADMIN — KETOROLAC TROMETHAMINE 30 MG: 30 INJECTION, SOLUTION INTRAMUSCULAR; INTRAVENOUS at 14:17

## 2023-07-16 RX ADMIN — ACETAMINOPHEN 1000 MG: 500 TABLET, FILM COATED ORAL at 12:23

## 2023-07-16 RX ADMIN — VITAMIN A, VITAMIN C, VITAMIN D, VITAMIN E, THIAMINE, RIBOFLAVIN, NIACIN, VITAMIN B6, FOLIC ACID, VITAMIN B12, CALCIUM, IRON, ZINC, COPPER 1 TABLET: 4000; 120; 400; 22; 1.84; 3; 20; 10; 1; 12; 200; 27; 25; 2 TABLET ORAL at 08:03

## 2023-07-16 ASSESSMENT — PAIN DESCRIPTION - PAIN TYPE
TYPE: ACUTE PAIN
TYPE: ACUTE PAIN;SURGICAL PAIN
TYPE: ACUTE PAIN
TYPE: ACUTE PAIN;SURGICAL PAIN
TYPE: ACUTE PAIN
TYPE: ACUTE PAIN;SURGICAL PAIN
TYPE: ACUTE PAIN;SURGICAL PAIN
TYPE: ACUTE PAIN
TYPE: ACUTE PAIN;SURGICAL PAIN
TYPE: ACUTE PAIN;SURGICAL PAIN

## 2023-07-16 ASSESSMENT — EDINBURGH POSTNATAL DEPRESSION SCALE (EPDS)
I HAVE FELT SCARED OR PANICKY FOR NO GOOD REASON: NO, NOT AT ALL
I HAVE BEEN ANXIOUS OR WORRIED FOR NO GOOD REASON: YES, SOMETIMES
I HAVE FELT SAD OR MISERABLE: NO, NOT AT ALL
I HAVE BEEN SO UNHAPPY THAT I HAVE HAD DIFFICULTY SLEEPING: NOT AT ALL
I HAVE BEEN SO UNHAPPY THAT I HAVE BEEN CRYING: NO, NEVER
THE THOUGHT OF HARMING MYSELF HAS OCCURRED TO ME: NEVER
I HAVE BEEN ABLE TO LAUGH AND SEE THE FUNNY SIDE OF THINGS: AS MUCH AS I ALWAYS COULD
I HAVE BLAMED MYSELF UNNECESSARILY WHEN THINGS WENT WRONG: NOT VERY OFTEN
THINGS HAVE BEEN GETTING ON TOP OF ME: NO, I HAVE BEEN COPING AS WELL AS EVER
I HAVE LOOKED FORWARD WITH ENJOYMENT TO THINGS: AS MUCH AS I EVER DID

## 2023-07-16 NOTE — ANESTHESIA POSTPROCEDURE EVALUATION
Patient: Fatemeh De La O    Procedure Summary     Date: 07/15/23 Room / Location: [facility] OR 02 / SURGERY LABOR AND DELIVERY    Anesthesia Start: 413 Anesthesia Stop:     Procedure:  SECTION, PRIMARY (Abdomen) Diagnosis: (Fetal intolerance)    Surgeons: Nery Hinson M.D. Responsible Provider: Frandy Alas M.D.    Anesthesia Type: epidural ASA Status: 3          Final Anesthesia Type: epidural  Last vitals  BP   Blood Pressure: 130/67    Temp   36.7 °C (98.1 °F)    Pulse   82   Resp   16    SpO2   92 %      Anesthesia Post Evaluation    Patient location during evaluation: floor  Patient participation: complete - patient participated  Level of consciousness: awake and alert  Pain score: 0    Airway patency: patent  Anesthetic complications: no  Cardiovascular status: hemodynamically stable  Respiratory status: acceptable  Hydration status: euvolemic    PONV: none          No notable events documented.     Nurse Pain Score: 0 (NPRS)

## 2023-07-16 NOTE — ANESTHESIA TIME REPORT
Anesthesia Start and Stop Event Times     Date Time Event    7/15/2023 0412 Ready for Procedure     0413 Anesthesia Start     2036 Anesthesia Stop        Responsible Staff  07/15/23    Name Role Begin End    Pierre Kenny M.D. Anesth 0413 0729    Frandy Alas M.D. Anesth 0729 2036        Overtime Reason:  no overtime (within assigned shift)    Comments:

## 2023-07-16 NOTE — PROGRESS NOTES
"POD #1    S:  Pt doing well.  Cem clears.   Possible flatus.  Isaac in place.  Pain controlled.  Working on breast feeding. Discussed Lovenox use while in hospital    O;  T 98  P 98  /61  ABD: Soft, ND, minimally tender, BS + normoactive, FF below umb, dressing with 2 cm area of dried blood on right side  EXT: +2 pedal edema SCDs in place.  No cords or Homans  H/H  pending - was 12/38 on admission  GBS neg  B+    A/P:  POD #1 S/P primary C/S for FTP, fetal tachycardia - doing well  Continue routine PP/post op care  \"Borderline\" gestational HTN - no elevated BP reading since admission.   F/U on H/H  Start Lovenox this morning for DVT/VTE prophylaxis   "

## 2023-07-16 NOTE — OP REPORT
DATE OF SERVICE:  07/15/2023     PREOPERATIVE DIAGNOSES:  1.  Intrauterine pregnancy at 39 and 5/7th weeks.  2.  Induction of labor for advanced maternal age.  3.  Induction of labor for borderline gestational hypertension.  4.  Morbid obesity.  5.  Failure to progress.  6.  Fetal tachycardia.     POSTOPERATIVE DIAGNOSES:  1.  Intrauterine pregnancy at 39 and 5/7th weeks.  2.  Induction of labor for advanced maternal age.  3.  Induction of labor for borderline gestational hypertension.  4.  Morbid obesity.  5.  Failure to progress.  6.  Fetal tachycardia.     PROCEDURE:  Primary lower uterine segment transverse  section.     SURGEON:  Nery Hinson MD     ASSISTANT:  Maranda Carrillo MD     ANESTHESIA:  Epidural.     ANESTHESIOLOGIST:  Frandy Alas MD     ESTIMATED BLOOD LOSS:  500 mL.     FLUIDS:  1000 mL crystalloid.     URINE OUTPUT:  200 mL clear urine.     FINDINGS:  Liveborn female infant, weight 7 pounds 14 ounces, Apgars 8 and 9,   vertex presentation, clear amniotic fluid.  Normal appearing uterus, tubes and   ovaries.  Normal-appearing placenta with 3-vessel cord.     SPECIMENS:  Placenta to pathology.     DRAINS:  Isaac to gravity.     COMPLICATIONS:  None apparent.     DISPOSITION:  The patient to recovery room in stable condition.     TECHNIQUE:  After adequate informed consent was obtained, the patient was   taken to the operating room.  Her epidural anesthesia was optimized.  She was   transferred to the OR table and laid in the supine position with a left   lateral tilt.  She had a vaginal prep performed.  She had a Isaac catheter   placed to gravity and sequential stockings placed on her lower extremities.    She received 500 mg of Zithromax IV preoperatively and 3 grams of Ancef.  She   was then prepped and draped in the usual sterile fashion.  A timeout was taken   to confirm the proper patient and procedure.  After ensuring adequate   anesthesia, a Pfannenstiel skin incision was  made and carried down sharply to   the fascia.  The fascia was nicked in the midline and dissected bilaterally   using sharp dissection.  The fascia was then  from the rectus muscle   both superiorly and inferiorly using sharp dissection.  The peritoneum was   identified and entered bluntly after ensuring no evidence of bowel or bladder   underneath.  The peritoneum was then dissected superiorly and inferiorly using   sharp dissection.  An Brian O retractor was placed with care taken to ensure   no bowel impingement occurred.  Using Metzenbaum scissors, a bladder flap was   created.  Using the scalpel, a transverse incision was made across the lower   uterine segment to the level of the amniotic sac.  The amniotic sac was   encountered and ruptured and clear fluid was noted.  The uterine incision was   extended bilaterally using blunt dissection.  The surgeon's hand was placed in   the uterine cavity and the fetal head was elevated and delivered without   complication from the LOT position.  Mouth and nose were bulb suctioned.    There was no evidence of a nuchal cord.  The rest of baby delivered easily.    This is a viable female infant, weight 7 pounds 14 ounces, Apgars 8 and 9.    There was delayed cord clamping for 30 seconds.  The cord was then clamped   twice and cut and the infant handed off to the waiting respiratory therapist   and nursing staff, crying vigorously.  The placenta was removed with manual   extraction and handed off to pathology to evaluate for chorioamnionitis.  The   uterine cavity was curetted using a dry lap sponge and there was no evidence   of any retained products of conception.  The patient received 20 units of IV   Pitocin and 1000 mL of lactated Ringer's IV.  The uterine incision was   identified and closed using a single suture of 0 Vicryl in a running locked   fashion.  An imbricating layer of 0 chromic was also placed.  The wound was   irrigated copiously and no bleeding  was noted.  The Brian O retractor was   removed.  Seprafilm was placed across the anterior surface of the uterus and   the lower uterine segment.  The peritoneum was identified and closed using a   single suture of 2-0 Vicryl in a running fashion.  The rectus muscles were   re-approximated in midline using a figure-of-eight suture.  The wound was   irrigated copiously and no bleeding was noted beneath the fascia.  The fascia   was re-approximated using two separate sutures of 0 Vicryl in a running   fashion overlapping in the midline.  The subcutaneous tissue was irrigated.    No bleeding was noted.  Ashley's fascia was re-approximated using a 3-0 Vicryl   in running fashion.  Subcutaneous tissue was re-approximated using 3-0 Vicryl   in a running fashion.  The skin was re-approximated using 4-0 Vicryl in a   running fashion.  Steri-Strips were placed and a Mepilex dressing was applied.    Sponge and instrument counts were correct x3.  The patient tolerated the   procedure well and was taken to the recovery room in stable condition and   there were no apparent complications.  She will be started on Lovenox 40 mg   subcutaneous every day for postoperative DVT and VTE prophylaxis.        ______________________________  MD DOREEN BURGER/FLOYD    DD:  07/15/2023 20:47  DT:  07/15/2023 21:35    Job#:  [id number]

## 2023-07-16 NOTE — PROGRESS NOTES
Pt feeling shaky and having nausea.  Temp 98. Cx still 8/100/-1 with edema at 9:00. Pit 12. FHT now 170s with  decreased variability.  Discussed arrest of dilation and suspected triple I.  P/C/R/B of continued attempt at vaginal delivery versus proceeding with C/S reviewed. ALl questions answered.  Pt agrees to C/S.  Consent signed . Plan for Zithromax 500 mg IV X 1 and vag prep.

## 2023-07-16 NOTE — LACTATION NOTE
This note was copied from a baby's chart.  Initial Visit:     History:   pc/s at 39+5weeks.  MOB hx: PCOS.      History of BF: None      Report of Current Breastfeeding Status: MOB reports baby has latched successfully twice since delivery, has been doing Hand Expression occasionally.     Breastfeeding Assistance: Provided breastfeeding assistance with: positioning of baby at the right and left breast in the cross cradle and football position.  Taught hand expression, MOB able to hand express independently. MOB was taught to place baby tummy to tummy and nipple to nose, wedging of the breast, and how to achieve deep latch.  Infant immediately  latched deep onto right breast and suckled with nutritive suck.  MOB denied pain with latch     Provided breastfeeding education on: hunger cues, frequency/duration of breastfeeds, skin to skin, cluster feeding, shallow vs deep latch, nutritive vs non-nutritive suck, pacifier use.     Plan: Continue to offer infant the breast per feeding cues for a minimum of 8 or more feeds in a 24 hour period. Frequent skin to skin while MOB awake and attentive.      Anticipate latch to continue to improve, however if latch score is less than 6 after 24 HOL, begin pumping with Ameda double breastpump q3hr.     Provided [facility] Breastfeeding Resources.  Referral sent to [facility]

## 2023-07-16 NOTE — PROGRESS NOTES
: Report from Heavenly BARGER. Patient resting in bed. Family at bedside. Patient states epidural has been working well. POC discussed, Primary  to take place.     : Transfer to OR 2 via labor bed.     : Delivery viable infant female.     : Monica Charge RN to bedside to assess incisional bleeding. Quarter-sized spot of blood seen on right side of dressing. Joya RIGGS notified. Sport circled and timed by this RN.  Dressing reinforced with Tegaderm per MD. Per MD notify if spot increases.     : Transferred to PP unit via rney with infant in arms, FOB at side. Report to Ana BARGER. Bands verified.

## 2023-07-16 NOTE — CARE PLAN
Problem: Knowledge Deficit - Postpartum  Goal: Patient will verbalize and demonstrate understanding of self and infant care  7/16/2023 0653 by Ana Cuevas, R.N.  Outcome: Progressing  7/16/2023 0356 by Ana Cuevas R.N.  Outcome: Progressing     Problem: Altered Physiologic Condition  Goal: Patient physiologically stable as evidenced by normal lochia, palpable uterine involution and vitals within normal limits  7/16/2023 0653 by Ana Cuevas R.N.  Outcome: Progressing  7/16/2023 0356 by Ana Cuevas R.N.  Outcome: Progressing   The patient is Stable - Low risk of patient condition declining or worsening    Shift Goals  Clinical Goals: stable vs and lochia wnl  Patient Goals: rest and feed baby q3  Family Goals: rest    Progress made toward(s) clinical / shift goals:    Pt reports comfort after pain interventions, incision cdi, Fundus firm and lochia light, VSS, educated on POC, needs met at this time. Questions answered. Will continue to educate.

## 2023-07-16 NOTE — CARE PLAN
Problem: Knowledge Deficit - Postpartum  Goal: Patient will verbalize and demonstrate understanding of self and infant care  Outcome: Progressing     Problem: Altered Physiologic Condition  Goal: Patient physiologically stable as evidenced by normal lochia, palpable uterine involution and vitals within normal limits  Outcome: Progressing   The patient is Stable - Low risk of patient condition declining or worsening    Shift Goals  Clinical Goals: stable vs and lochia wnl  Patient Goals: rest and feed baby q3  Family Goals: rest    Progress made toward(s) clinical / shift goals:    Pt reports comfort after pain interventions, incision cdi, Fundus firm and lochia light, VSS, educated on POC, needs met at this time. Questions answered. Will continue to educate.

## 2023-07-17 ENCOUNTER — PHARMACY VISIT (OUTPATIENT)
Dept: PHARMACY | Facility: MEDICAL CENTER | Age: 38
End: 2023-07-17
Payer: COMMERCIAL

## 2023-07-17 VITALS
BODY MASS INDEX: 47.32 KG/M2 | SYSTOLIC BLOOD PRESSURE: 124 MMHG | HEART RATE: 105 BPM | OXYGEN SATURATION: 98 % | WEIGHT: 284 LBS | RESPIRATION RATE: 18 BRPM | TEMPERATURE: 97.5 F | DIASTOLIC BLOOD PRESSURE: 74 MMHG | HEIGHT: 65 IN

## 2023-07-17 LAB — PATHOLOGY CONSULT NOTE: NORMAL

## 2023-07-17 PROCEDURE — A9270 NON-COVERED ITEM OR SERVICE: HCPCS | Performed by: OBSTETRICS & GYNECOLOGY

## 2023-07-17 PROCEDURE — 700111 HCHG RX REV CODE 636 W/ 250 OVERRIDE (IP): Mod: JZ | Performed by: OBSTETRICS & GYNECOLOGY

## 2023-07-17 PROCEDURE — RXMED WILLOW AMBULATORY MEDICATION CHARGE: Performed by: OBSTETRICS & GYNECOLOGY

## 2023-07-17 PROCEDURE — 700102 HCHG RX REV CODE 250 W/ 637 OVERRIDE(OP): Performed by: OBSTETRICS & GYNECOLOGY

## 2023-07-17 RX ORDER — OXYCODONE HYDROCHLORIDE 5 MG/1
5 TABLET ORAL EVERY 4 HOURS PRN
Qty: 30 TABLET | Refills: 0 | Status: SHIPPED | OUTPATIENT
Start: 2023-07-17 | End: 2023-07-24

## 2023-07-17 RX ADMIN — VITAMIN A, VITAMIN C, VITAMIN D, VITAMIN E, THIAMINE, RIBOFLAVIN, NIACIN, VITAMIN B6, FOLIC ACID, VITAMIN B12, CALCIUM, IRON, ZINC, COPPER 1 TABLET: 4000; 120; 400; 22; 1.84; 3; 20; 10; 1; 12; 200; 27; 25; 2 TABLET ORAL at 09:41

## 2023-07-17 RX ADMIN — OXYCODONE HYDROCHLORIDE 5 MG: 5 TABLET ORAL at 04:25

## 2023-07-17 RX ADMIN — IBUPROFEN 800 MG: 800 TABLET, FILM COATED ORAL at 11:40

## 2023-07-17 RX ADMIN — ENOXAPARIN SODIUM 40 MG: 100 INJECTION SUBCUTANEOUS at 09:40

## 2023-07-17 RX ADMIN — ACETAMINOPHEN 1000 MG: 500 TABLET, FILM COATED ORAL at 11:40

## 2023-07-17 RX ADMIN — ACETAMINOPHEN 1000 MG: 500 TABLET, FILM COATED ORAL at 04:25

## 2023-07-17 ASSESSMENT — PAIN DESCRIPTION - PAIN TYPE
TYPE: ACUTE PAIN
TYPE: ACUTE PAIN;SURGICAL PAIN
TYPE: ACUTE PAIN
TYPE: ACUTE PAIN
TYPE: ACUTE PAIN;SURGICAL PAIN
TYPE: ACUTE PAIN
TYPE: ACUTE PAIN
TYPE: ACUTE PAIN;SURGICAL PAIN

## 2023-07-17 NOTE — PROGRESS NOTES
Assessment done and WNL, pain level comfortable at this time.  dressing remains with old drainage without increase from markings. Reviewed safety and fall risk. Declines needs at this time. Encouraged to call with all needs.

## 2023-07-17 NOTE — LACTATION NOTE
Lactation follow-up visit:    S: MOB reported she is feeling more comfortable with breastfeeding following previous LC's visit yesterday.  MOB denied pain and tissue damage to her breasts with latch.    O: Infant's weight loss to date within defined limits at 3% and baby is voiding/stooling within defined limits.    A/P: Observed MOB place baby to her left breast in a modified football hold position with FOB holding baby at the breast for the entire feed.  Infant observed feeding with shallow latch.  Demonstrated positioning with baby in the football hold position and the laid back position.  Also, taught parents wedging of the breast and how to achieve asymmetrical latch.  Deep latch achieved at both breasts.  However, infant fell asleep after feeding for approximately 5 minutes at both breasts combined.  MOB stated she fed for longer periods of stretches last night and early this morning.  She stated infant last fed at approximately 0830 also for 5 minutes.    Discussed and educated parents on nutritive vs non-nutritive suck and shallow vs deep latch.    Re-demonstrated hand massage and hand expression to MOB.    Breastfeeding plan:  Continue to offer infant the breast per feeding cues for a minimum of 8 or more feeds in a 24 hour period.     Encouraged MOB to continue to do skin to skin with infant to help infant become more familiar where her food is coming from.    Reinforced with parents of baby of the importance of offering infant both breasts at each feed and making sure baby feeds for a minimum of 10-15 minutes at both breasts combined.    MOB verbalized understanding of all information provided to her and denied having any further questions at this time.  Encouraged MOB to call for lactation assistance as needed.

## 2023-07-17 NOTE — DISCHARGE SUMMARY
DATE OF ADMISSION:  2023   DATE OF DISCHARGE:  2023     ADMITTING DIAGNOSES:  1.  Intrauterine pregnancy at 39 and 4/7th weeks.  2.  Advanced maternal age.  3.  Gestational hypertension.  4.  Morbid obesity with a BMI of 47.  5.  GBS negative.     Please see previously dictated history and physical.     HOSPITAL COURSE:  The patient was admitted with the above diagnoses on   2023.  She had a borderline gestational hypertension with one elevated   blood pressure and the recommendation was made by her primary OB to undergo   induction.  Her laboratory values were all normal throughout her   hospitalization.  She did not have any elevated blood pressures, nor did she   require any antihypertensive medications.  The patient received 2 doses of   misoprostol for cervical ripening and was changed to Pitocin.  She had an   epidural for pain management.  Unfortunately, she did not dilate past 8 cm and   then there was fetal tachycardia.  For these reasons, the patient   subsequently underwent a primary lower uterine segment transverse    section for failure to progress and fetal tachycardia.  She had an estimated   blood loss for the procedure of 500 mL.  She had a viable female infant,   weight 7 pounds 14 ounces, Apgars 8 and 9.  The placenta was sent to Pathology   to evaluate for triple I.  The patient's postpartum and postoperative course   have been unremarkable and on postoperative day #2, she was passing flatus,   tolerating a regular diet, no problems voiding.  Her pain was controlled.  She   had minimal lochia, breastfeeding well, and was ready to be discharged home.     PHYSICAL EXAMINATION ON DISCHARGE:  VITAL SIGNS:  Temperature is 97.6, pulse 95, and blood pressure 132/78.  ABDOMEN:  Obese, nondistended, minimally tender to palpation.  Bowel sounds   are positive and normoactive.  Her fundus is firm below the umbilicus.  Her   dressing has two areas of old dried blood, which had not  expanded during her   hospitalization.  EXTREMITIES:  Show +2 pedal edema.  She has no cords or Homans sign.     LABORATORY DATA:  Her postoperative H and H were 10 and 32.  Her blood type is   B positive.  Her group B strep status was negative.     DISCHARGE DIAGNOSES:  1.  Intrauterine pregnancy at 39 and 5/7th weeks.  2.  Induction of labor for advanced maternal age.  3.  Induction of labor for borderline gestational hypertension.  4.  Morbid obesity.  5.  Failure to progress.  6.  Fetal tachycardia.     DISCHARGE INSTRUCTIONS:  1.  The patient will be discharged home with instructions to follow up with   Dr. Michele in 2 weeks.  2.  She is instructed on pelvic rest for 6 weeks, no driving for 2 weeks, no   lifting over 10 pounds for 6 weeks.  3.  She is given a prescription for Roxicodone and will take the   over-the-counter ibuprofen, Tylenol and prenatal vitamins.        ______________________________  MD DOREEN BURGER/SHEKHAR/YOGESH    DD:  07/17/2023 06:07  DT:  07/17/2023 10:17    Job#:  [id number]    CC:YASH TEIXEIRA MD

## 2023-07-17 NOTE — PROGRESS NOTES
POD #2    S:  Pt doing better to day.  Passing flatus.  Cem reg diet.  No problems voiding. Pain controlled.  Breast feeding.  Ready to go home.    O: T 97.6 P 95  /78  ABD: Obese, ND, minimally tender, BS + normoactive, ff below umb, dressing with two areas of dried blood  EXT: +2 pedal edema, no cords or Homans  H/H 10/32  B+  GBS neg    A/P:  POD #2 S/P primary C/S for FTP and fetal tachycardia - improved   D/C home  F/U Dr. Michele 2 weeks  Pelvic rest for 6 weeks  No lifting > 10 pounds for 6 weeks.   OTC Motrin and PNV.  Rx brenner Roxicodone

## 2023-07-17 NOTE — CARE PLAN
The patient is Stable - Low risk of patient condition declining or worsening    Shift Goals  Clinical Goals: ambulate, urinate, pain control  Patient Goals: rest and feed baby q3  Family Goals: rest    Progress made toward(s) clinical / shift goals:  Pain maintained comfortable level with scheduled medications. Respiratory rate even and unlabored lungs clear through shift. Up ambulating in room and to restroom as needed.

## 2023-07-17 NOTE — CARE PLAN
Problem: Knowledge Deficit - Postpartum  Goal: Patient will verbalize and demonstrate understanding of self and infant care  Outcome: Progressing     Problem: Altered Physiologic Condition  Goal: Patient physiologically stable as evidenced by normal lochia, palpable uterine involution and vitals within normal limits  Outcome: Progressing   The patient is Stable - Low risk of patient condition declining or worsening    Shift Goals  Clinical Goals: stable VS and pain control  Patient Goals: rest and feed baby\support  Family Goals: rest    Progress made toward(s) clinical / shift goals:    Pt reports comfort after pain interventions, incision cdi, Fundus firm and lochia light, VSS, educated on POC, needs met at this time. Questions answered. Will continue to educate.

## 2023-07-17 NOTE — PROGRESS NOTES
Assessment done and WNL, Incision dressing with old as well as new drainage, marked and MD notified. Isaac removed with good urine output. Patient stable while up and encouraged to drink water and need to void within 6 hours. Verbalized understanding. Reviewed room safety, infant safety and encouraged to call with any needs.

## 2023-07-17 NOTE — DISCHARGE INSTRUCTIONS

## 2023-07-31 ENCOUNTER — OFFICE VISIT (OUTPATIENT)
Dept: OBGYN | Facility: CLINIC | Age: 38
End: 2023-07-31
Payer: COMMERCIAL

## 2023-07-31 DIAGNOSIS — S20.129A MILK BLEB, POSTPARTUM: Primary | ICD-10-CM

## 2023-07-31 DIAGNOSIS — O92.5 LACTATION SUPPRESSION: ICD-10-CM

## 2023-07-31 DIAGNOSIS — O92.29 MILK BLEB, POSTPARTUM: Primary | ICD-10-CM

## 2023-07-31 PROCEDURE — 99215 OFFICE O/P EST HI 40 MIN: CPT | Performed by: NURSE PRACTITIONER

## 2023-07-31 PROCEDURE — G2212 PROLONG OUTPT/OFFICE VIS: HCPCS | Performed by: NURSE PRACTITIONER

## 2023-07-31 RX ORDER — TRIAMCINOLONE ACETONIDE 1 MG/G
1 OINTMENT TOPICAL 3 TIMES DAILY
Qty: 15 G | Refills: 0 | Status: SHIPPED | OUTPATIENT
Start: 2023-07-31 | End: 2023-08-07

## 2023-07-31 RX ORDER — TRIAMCINOLONE ACETONIDE 40 MG/ML
40 INJECTION, SUSPENSION INTRA-ARTICULAR; INTRAMUSCULAR ONCE
Status: SHIPPED | OUTPATIENT
Start: 2023-07-31 | End: 2023-08-03

## 2023-07-31 NOTE — PROGRESS NOTES
Summary: Exclusive breastfeeding, 9% initial loss, at two week check baby still 4-5% under birth weight and formula was recommended along with increasing demand. Mom did offer a 2oz bottle, baby uncomfortable after, adapted to the nipple and drank almost the whole bottle. Mom felt she was making more milk so that is the only formula she got over the weekend. Baby gained 2.9oz in 3days, not scale to scale. Mom has single pumped 3x since birth. Mom has a bleg on the left nipple she believes opened but still hurts.   Today: Mom independently latched baby to the left breast she removed 0.6oz, moved to the right for 0.5oz, still fussy and I wanted to trial switch nursing where baby removed another 0.1oz from the left and an additional 0.4oz from the right, total intake 1.6oz and had fed 2 hours ago. Pumped less than 2ml, spoon fed back.   Plan: Maximize supply with feeding more often at least every 2-3 in the day, one four hour at night. Pump after nighttime feeding or first morning feeding to have some topping off milk available for her. Those are your highest quantities of milk. With increasing supply with more frequency over the weekend, infant weight increased almost an ounce per day, mom may offer shared milk or formula 2oz/24 hours as she sees her baby asking for more - parents most responsive but would prefer exclusive breastfeeding. Switch nurse at least the right side twice all feeding within about 20 minutes. She may be at the breast longer but it typically isn't feeding but happy to suckle and be with mom time  Follow up:   Lactation appointment:  8/10/23  Baby 's Provider appointment:  Weight follow up Friday, 8/4/23  Referrals: None    Maternal Diagnosis/Problem:  Lactation Suppression , milk bleb  Infant Diagnosis/Problem:  At risk for breastfeeding difficulties    Subjective:     Fatemeh De La O is a 38 y.o. female here for lactation care. She is here today with baby and   (Lobito).    Concerns:   Maternal: Feeling that there is not enough milk , Weight check, Infant feeding evaluation, and Breastfeeding questions   Infant: Slow weight gain     HPI:   Pertinent  history: c/section and primary    Mother does not have a history of GDM, hypertension prior to pregnancy, GHTN, multiple gestation, thyroid disease, placenta encapsulation, and breast surgery    Mother does have advanced maternal age, insulin resistance, PCOS, and auto immune disease . Common condition(s) that may interfere in milk supply.    Breast changes in pregnancy: Minimal  History of breast surgeries: No    FEEDING HISTORY:    Previous Breastfeeding History: First baby.     Currently 2023: Exclusive breastfeeding, 9% initial loss, at two week check baby still 4-5% under birth weight and formula was recommended along with increasing demand. Mom did offer a 2oz bottle, baby uncomfortable after, adapted to the nipple and drank almost the whole bottle. Mom felt she was making more milk so that is the only formula she got over the weekend. Baby gained 2.9oz in 3days, not scale to scale. Mom has single pumped 3x since birth. Mom has a bleg on the left nipple she believes opened but still hurts.      Both breasts: Yes    Supplement: None  until day 14  Quantity: 1.5oz once  How given/devices: Bottle    Nipple Shield Use: None    Breast Pumping:  Not Pumping routinely, appropriately  Type of Pump: Spectra  Flange size/type: 24mm  Wearable: no  NO pain with pumping    Maternal ROS:  Constitutional: No fever, chills. Feeling well  Breasts: No soreness of breasts and Soreness of nipples on the right   Psychiatric: Managing ok  Mental Health: No mention of feeling irritable, agitated, angry, overwhelmed, apathetic, appetite changes, exhausted nor having sleep changes outside infant feeds/demands.  Current Outpatient Medications on File Prior to Visit   Medication Sig Dispense Refill    Prenatal Vit-Fe Fumarate-FA  (PRENATAL 1+1 PO) Take  by mouth.       No current facility-administered medications on file prior to visit.      Past Medical History:   Diagnosis Date    Allergy     seasonal    Asthma     Chronic bronchitis (HCC)          Objective:     Maternal Physical Lactation Exam  General: No acute distress  Breasts: Symmetrical , Soft, Plugged Duct - no evidence, and Mastitis  - no S/S  Nipples: bleb right nipple face  Psychiatric: Normal mood and affect. Her behavior is normal. Judgment and thought content normal.  Mental Health: Did NOT exhibit sadness, crying, feeling overwhelmed, agitation or hypervigilance.      Assessment/Plan & Lactation Counseling:     Infant Exam on Infant Chart    Infant Weight History:   7/15/23 7#13.9oz  7/28/23 7#8oz  07/31/2023: 7#10.9oz dry diaper    Infant Intake at Breast:   Left 0.6 +0.1oz       Right 0.5oz + 0.4oz    Total: 1.6oz  Milk Transfer at this feeding:   Effective breastfeeding for available milk   Pumped:   Type of Pump: Spectra   Quantity Pumped:     Total: 2ml  Initiation of Feeding: Infant initiates  Position of Feeding:    Right: football and cross cradle  Left: football  Attachment Achieved: rapidly  Nipple shield: N/A   Suck Pattern at the Breast: Suck burst and normal rest with non nutritive sucking once available milk gone  Suck Pattern on the Bottle: Not Indicated   Behavior Following Observed Feeding: content  Nipple Pain: bleb right nipple face    Latch: Mom latches independently, fine tuning discussed with using a C hand position  Suckling/Feeding: attaches, audible swallows, baby roots, elicits PRIETO, and rhythmic  Sucking strength: Moderate Strong  Sucking Rhythm Coordinated   Compression: WNL    Once latched, baby fell into a mature and fully integrated SSB pattern.    Swallowing No difficulty noted  If functional feeding, it is quiet, rhythmic, coordinated, organized, effeicent safe, satisfying and pleasurable for both parent and baby?  No  Milk Supply Available:  Building    Low Milk Supply:   Likely due to: maternal medical history and ineffective or infrequent breast stimulation or milk removal    MATERNAL PERSONALIZED BREASTFEEDING PLAN  Discussed concerns and symptoms as listed above in assessment and guidance summarized below. Shared decision making was used between myself and the family for this encounter, home care, and follow up. Topics reviewed included:   4th Trimester: The 12-week period immediately after mom has had the baby. Not everyone has heard of it, but every mother and their  baby will go through it. It is a time of great physical and emotional change as the baby adjusts to being outside the womb, and the family adjusts to new life as parents  During the fourth trimester, one can expect fussiness and crying from the baby and very likely exhaustion for the family. [place] babies are learning to adjust to life outside the womb where it was warm and squishy!  There is a lot of misinformation about babies and their needs, and parents are often encouraged to ignore baby's signals. Bad idea. Babies are “half-baked” at birth and have much to learn with the help of physical and emotional support from caregivers. Taking care of a baby's needs is an investment that pays off with a happier, healthier child and adult.  It can take weeks or even months for your body to feel totally normal again. There is a major hormonal upheaval experienced by moms in the first few weeks after birth, because their bodies are shifting from many pregnancy hormones to a more normal hormonal make-up.  These first three months with baby earth side is a delicate time. Honor it with a mindful dose of support. Mindful Mamma's is an norberto that may help.   Self-Compassion through Relational Support and Interaction.   Be kind to ourself. This is the first step in reducing anxiety and depression. Pay attention to how you are doing.   What do you need? Food, Rest, Sleep, Support, to  "Laugh/giggle: who will you ask today? They want to help you. We want to help you.   How do you stop your self-blame, or your self-criticism?   Get out of the house each day, walk or drive somewhere or ask a friend to drive you,  a \"treat\" at a drive through.   Mood and Anxiety Disorders: Having a new baby can be joyful time for some new moms, but a difficult time for others. For all, it is a time of profound physical and emotional change. Balancing baby, family, partners and friends, work, pets, and preexisting or new life stressors as well as sleep deprivation can be extremely challenging. Untreated depression, sleep exhaustion, and anxiety are each a challenge that must be addressed and in addition can contribute to early cessation of breastfeeding. It is important both for the mental health and physical health of new moms and babies to get on the path to feeling better and if therapeutic support is needed, specific resources are available, please ask.   The nature of infants oral head/neck structure and function and its impact on latch and transfer of milk.   Discussed Mechanical forces resulting in strain patterns during intrauterine life and during birth may negatively affect the oral-motor function of the  and compromise structure and function.  Joint restriction or hypo-mobility could be a logical progression following the relative lack of mobility during the last crowded months of gestation. An exceptionally flexed or rotated fetal posture may tighten myofascial structures in the neck, restricting the hyoid bone and strap muscles that support an effective swallow. More research reveals the body as an integrated whole via its major structure-maintaining and sensory organ, the fascia.  Other musculoskeletal issues, such as neck preferences, may also affect an infant's ability to nurse. These views have expanded and deepened over time.   Unilateral neck rotation is a normal  " finding that should correct itself over the next few weeks.    However when there is a neck preference it can make latching more difficult.    Infant head can be supported in the car seat.    Bottle feeding baby's can be encouraged to look to the opposite side of the preference  Infant can be can talked to from the side encouraging baby to turn to.   Milk Supply is dependent on glandular tissue development, hormonal influences, how many times the baby removes milk and how well the breasts are emptied in a 24 hour period. This is a biological reality that we can NOT work around. If, for any reason, your baby is not latching, or you are not able to nurse, then it is important for you to remove the milk instead by pumping or hand expression.  There's no magic trick, tea, food, drink, cookie or supplement that will increase your milk supply. One  must  effectively remove milk to continue to make and maximize milk. In the early days and weeks that can be 8+ times in 24 hours. For older babies, on average 6-7 + times in 24 hours.    Hydration: Staying hydrated is important however lack of hydration is usually not a cause of significantly low milk production.  Everyone needs a different amount of water, depending on their activity and diet. A high salt and/or high-protein diet, high physical activity, or very warm weather/sweating will require more fluids. A person eating a diet high in veggies and fruit, with a lack of physical activity will require fewer fluids. There is no magic number for the # of ounces of water each day.The best way to know that you are well hydrated is by looking at your urine.  Urine should look clear to light pale yellow, and you should need to pee at least every 3 to 4 hours unless you have a large bladder capacity.  Herbs and Medications: A galactagogue is an herb or medication taken by a breastfeeding mother to increase her milk supply. We know that for centuries mothers around the world have  sought out remedies to increase their milk supply. However, there is limited research on the safety and effectiveness of herbal galactagogues, which makes it hard for us to endorse them. It is not known if any of these herbs are truly effective, and it is difficult to predict how a specific herbal galactagogue will affect an individual, requiring “trial and error” in many situations. When effective, results are generally seen within 24-72 hours of starting an herbal galactagogue. Many of these herbs are used to decrease high blood sugar. If you are diabetic or have problems with low blood sugar, or thyroid disease you may not be a candidate for herbs. Not all women can increase their low milk supply with a galactagogue due to the many underlying causes of low milk production.  When taking a galactagogue, remember that frequent milk removal is still the most effective way to increase supply.   Feeding:   Feed your baby every 1.5-3 hours, more often if baby acts hungry.   Awaken baby for feeding if going over 3 hours in the day.   Until back to birth weight, ONE four hour at night is acceptable if has had 8 prior feedings in 24 hours.    Daily goal: 8-12 feedings per 24 hours.   Strategies to facilitate feedings  Increase frequency  Pump after a morning feeding to distribute in the day  Supplement:   Supplement with expressed milk or   Supplement with formula  Hoping to pump off 1.5-2oz after morning feeding to use in the day and help increase supply.     Proper powder formula preparation: https://www.cdc.gov/nutrition/downloads/prepare-store-powered-nptbil-jtgfrar-516.pdf.   For babies under 2 months: https://www.cdc.gov/cronobacter/infection-and-infants.html  Pacing the feeding:  A slow flow nipple helps, but how you feed the baby is more important.  How you are  positioning the bottle can compensate for a faster flow nipple.  When bottle-feeding, the baby should control how much is consumed at a feeding.  Holding  the baby in an upright position with the bottle horizontal ensures that the baby gets milk only when sucking.  Here is a nice video demonstrating this concept of paced bottle feeding,  https://www.youtube.com/watch?v=MpGCS6vNY5I Think baby led, not parent led.  Pacifier Use:  The American Academy of Pediatrics' Position Paper reports: Although we recommend a conservative approach regarding pacifier use, we do not endorse a complete ban on the use of pacifiers, nor do we support an approach that induces parental guilt concerning their choices about the use of pacifiers. Pacifier use and breastfeeding in term and  newborns- a systematic review and meta-analysis from the  J of Pediatrics Published online 2022. Has found that when pacifiers are used among individuals who have been counseled on the risks, do not interfere with breastfeeding exclusivity or duration. These are parental choices.   Positioning Techniques for Bare Breast  Pillow used: Miroslava 2 Nu Jared  Suggested positions Cross cradle, Football, and Side lying  Fine tune position by making sure your fingers beneath the breast as well as your bra, are out of the way of your baby's chin.  Positioning: See http://globalhealthmedia.org/portfolio-items/positions-for-breastfeeding/?mvvzxqahrMI=97671  Latch on Techniques for Bare Breast.   Aim is an asymmetric deep latch.  First make yourself comfortable. Sit with knees bent (unless lying down), feet on a stool if needed. You will support your baby, and pillows will be used to support YOU. You want your baby's belly facing your breast/body (belly to belly). If your baby is extremely fussy and crying, do skin-to-skin for a while until he or she calms down, then try (or try again).   Fine tune latch:  By holding your baby more securely at the breast, assisting your baby to stay attached by:  Support your baby with your hand behind the shoulder blades (NOT THE HEAD).  1. Align your baby's NOSE  "with your nipple  2. Your baby's chin should be the first part of his or her body to touch your breast  3. Tickle the baby's upper lip or nose with your nipple  4. When your baby's mouth is WIDE OPEN, swiftly bring your baby's mouth over your nipple/areola.  Steps 2 and 3 could be slightly reversed order or essentially happening at the same time.  Bringing your baby to your breast, not breast to the baby  Your baby's cheek to touch breast securely, nose tipped back  Hold your baby firmly in place so when your baby forgets to suck and picks it back up again your baby is in the correct spot. You will be extinguishing behavior and replacing it with a deeper latch to stimulate suck and provide satisfaction at the breast.  Your baby needs as much breast as deep in the mouth as possible to allow your nipples to heal and for you more importantly to maximize efficiency at the breast  If that doesn’t help, you should make an appointment with me to re-evaluate.   Latch is asymmetrical, leading with the chin, getting the baby below the breast, as if offering a large \"deli lele sandwich\".  Here is a video from MATTHEW on the asymmetric latch       https://www.[employer].com/watch?v=0I-QGw2Ix89  Triple Feeding:  Things can change on a daily basis in the first few weeks, and feeding plans may need to adapted to protect both mom’s mental health and infant caloric intake.  The alternative to triple feeding is what I call “divide and conquer” or 'double feeding\":  Mom focuses on pumping to stimulate her breasts and remind the breasts that even if the baby got sleepy and behind in calories, they do in fact need to produce milk.  This is one of the  times where a pump is useful and medically indicated (aside from NICU, or other separation of mom and infant).  Dad, partner, or other relative focuses on getting calories in the baby.    It is OK to give a bottle.  Nipple confusion” in the setting of low milk production is actually more of " “flow preference.”  Babies will either fuss at a breast without enough milk, or fall asleep.  Have mom put baby to breast at times when her milk flow should be higher (in the early morning hours; even moms with plenty of milk will have less in the afternoon and evening).  This is simply to maintain baby’s familiarity with mom’s breast.  The baby can suck for comfort, but it is necessary to ensure the baby continues to take in adequate volume from the bottle.  Over time, mom can gradually notice the baby swallowing more and the breast and being more satisfied.  However, this does not happen immediately and some moms have to continue supplementing for the duration of breastfeeding.  Like all of parenting, breastfeeding is a MARATHON and NOT a sprint.  It is much better to give a baby a bottle to sustain early breastfeeding challenges than become utterly exhausted with triple feeding and then give up.  Pumping Guidelines:  Both breasts 1 time in 24 hours  Wearable pumps are not recommended to establish breastfeeding or regular use unless hyperlactation.  If working, it is suggested you pump once with a good personal pump to assure full emptying.  Bra    Consider a hands free bra - [employer]ishes is recommended.  Type of Pump:  Spectra  Spectra Settings  Press letdown button when first starting         Cycle: 70 / Vacuum: L1 - L 5 (To comfort)  Once milk lets down, press letdown button again  Cycle: 54 / Vacuum: L1 - L12 (To comfort)  Power Pumping Power pumping, it does not make sense.  Let’s say a mother’s milk production is 30ml/hour and her baby is an excellent feeder at the breast. Let’s say she nurses at 7 pm, and then decides to power pump at 8 pm, when her baby goes to sleep. She pumps 3-4 times from 8-9 pm. She is likely still only going to express 30 ml during that hour, maybe a little more. One could argue that she is driving up her prolactin  (PRL)  by pumping so much in an hour. But PRL has a short half  life- if it is bumped up once at 8 pm, or 3 times between 8-9 pm, the PRL level will be the same either way at 2 am.It may be more effective for people who are great milk producers- people who have the propensity to make a lot of milk have a great deal of tissue to stimulate and can make much more milk than what the baby takes. Power pumping for people with low milk production is stressful, takes time from their infant and caring for themselves, and I have rarely if ever found it be beneficial. We need to remember that highly motivated lactating individuals will go to the extreme to be successful- pump every 2 hours day and night, power pump, make lactation cookies, etc etc. There is a serious risk of harm when asking people to do too much. So, I must have a very solid and rational reason for the advice that I give.  How long will vary woman to woman, typically 8-15 minutes, or 1-2 minutes after flow slows  Flange Fit: Freely moving nipple in the tunnel with some movement of the areola.  Today's evaluation indicates appropriate flange 24mm  Increasing supply besides Galactogogue's and Pumping:  Warmth  Relaxation   Physical, auditory narratives  Childbirth relaxation Techniques  Acupuncture and acupressure  [facility] [email or web address]   [facility] [email or web address]  Nipple care:    May apply breastmilk  Medela Hydrogels, Lansinoh Soothies and Ameda comfort gels are all the same, different companies    BLEB:  [email or web address]/mythbusters/blebs-mythbusters/  This appears as a small white dot on the tip of the nipple and is usually very painful. It is one milk duct that has become plugged.      Remedy:   Apply steroid cream and may cover steroid cream with hydrogel dressing.  0.1% triamcinolone cream applied three times a day for the first week, and if much better, then decrease to twice a day for 3-4 days, and as long as continuing to improve, decrease  to once a day for 3-4 days, then stop.Dispense 15g tube This topical steroid is being used to reduce pain and thin the inflamed, fibrinous tissue obstructing the nipple orifice. If it is used three times a day regularly and stopped abruptly, the rash can rebound.  If  chronic, may need Lecithin  Answers to FAQs: https://www.[email or web address]/category/[email or web address]  Alcohol & Breastfeeding: What's your time-to-zero?  Cough & Cold Medications while Breastfeeding  Vitamin D Supplementation and Breastfeeding  Antidepressant Use While Breastfeeding: What should I know?  Breastfeeding, Caffeine, and Energy Drink  Connect with other mothers:  Facebook:   [place] [facility]: [email or web address]  Well-Nourished Babies (Private group for questions and support): [email or web address]  Breastfeeding [place] including opportunity to weight your baby and do before and after weights WEIGHT CHECKS  Tuesdays 10am - 11am. [facility] at [address] [place] [address], [facility] [address] [place] [address], 3rd floor conference room  Check your baby's weight, do a feeding and see how your baby is growing, visit with other mothers, plan on a walk or coffee date after group.  Please download Growth: Baby and Child for Apple or Child Growth Tracker for [employer] if you would like to  document and follow your baby's growth curve.  Due to space limitations - limit strollers please (New c/section moms please use your stroller).  We would love to have dads stay, but moms won't breastfeed if there are men in the room, sorry.  The room is generally scheduled for another event following group.  Please take all diapers with you   ONLINE SUPPORT GROUPS  Postpartum [employer] (PSI) support groups are conducted using a uelz-dl-yghf support model and are not intended for those experiencing a mental health crisis. Groups are 90 minutes (1.5 hours) in length. The first ~30 minutes is providing information, education, and  establishing group guidelines. The next ~60 minutes is “talk time,” in which group members share and talk with each other. Group members must be present for the group guidelines before joining in the discussion or “talk time.”     In Conclusion:   Managing breastfeeding and milk supply is very dynamic,can be a complex and intimate journey, and is not one size fits all. When obstacles present themselves, it takes confidence, persistence and support. The rights of the child include optimal nutrition and mothers need help to make informed decisions. You  and your baby have been screened for biological, psychological, and social risk factors that might interfere with achieving your goals.  Support is critical. We want the family thriving not just tolerating life. You are now the focus of our Breastfeeding Medicine team; we are here to support your decisions and vision.     Follow up requires close monitoring in this time sensitive window of opportunity to establish milk suppl. Please call [phone] our voicemail line or the front office at [phone] to scheduled your next appointment.  Family is encouraged to e-mail or mychart us to update how the plan is working.    A total of 75 minutes, not including infant assessment time,  was spent preparing to see the patient, obtaining and reviewing separately obtained history, performing a medically appropriate examination and evaluation, counseling and educating the family, ordering medications, test or procedures, referring and communicating with other health care professionals, documenting clinical information in the electronic health record, independently interpreting weighted feeds and infant growth results, communicating these results to the family and care coordination as detailed in the above note.       JAM Fuentes.

## 2023-08-09 NOTE — DOCUMENTATION QUERY
"                                                                         [facility]                                                                       Query Response Note      PATIENT:               DEBRA DIAZ  ACCT #:                  [id number]  MRN:                     [id number]  :                      1985  ADMIT DATE:       2023 6:15 AM  DISCH DATE:        2023 2:42 PM  RESPONDING  PROVIDER #:        [id number]           QUERY TEXT:    Documentation in the medical record indicates that this patient is being evaluated for chorioamnionitis.    Please clarify status of this condition:    NOTE:  If an appropriate response is not listed below, please respond with a new note.       The patient's Clinical Indicators include:  PN 7/15 Dr. Hinson- \" Discussed arrest of dilation and suspected triple I.\"    OP report 7/15 Dr. Hinson- \"The placenta was removed with manual extraction and handed off to pathology to evaluate for chorioamnionitis\"    DC summary: \"The placenta was sent to Pathology to evaluate for triple I. \"    Pathology Report - The fetal  membranes demonstrate acute chorioamnionitis and meconium staining.    Treatment: IV Zithromax    Risk factors: Advanced maternal age, Gestational hypertension, Morbid obesity.    Thank you,  [email or web address]  Options provided:   -- Chorioamnionitis is ruled in   -- Chorioamnionitis is  ruled out   -- Unable to determine      Query created by: China Molina on 2023 11:19 AM    RESPONSE TEXT:    Chorioamnionitis is ruled in          Electronically signed by:  JOSÉ MIGUEL HINSON MD 2023 6:45 PM              "

## 2023-08-10 ENCOUNTER — OFFICE VISIT (OUTPATIENT)
Dept: OBGYN | Facility: CLINIC | Age: 38
End: 2023-08-10
Payer: COMMERCIAL

## 2023-08-10 DIAGNOSIS — O92.70 LACTATION PROBLEM: ICD-10-CM

## 2023-08-10 PROCEDURE — 99215 OFFICE O/P EST HI 40 MIN: CPT | Performed by: NURSE PRACTITIONER

## 2023-08-10 NOTE — PROGRESS NOTES
Summary: Exclusive breastfeeding, infant gaining well now. Mom has offered 2oz of formula over the last 10 days once appropriately. Mom enjoys breastfeeding, is working with Violet's routine and content with the dance they have created.  Fatemeh double pumps once per day some times and returns the milk to Violet in the day as needed.Not saving milk, will be returning to work short term  Today: Mom independently latched baby to the left breast she removed 0.5oz, moved to the right for another 0.9oz total intake today 1.4oz. Had feed another 1.5 hours ago.  Pumping not indicated  Plan: Enjoy your breastfeeding relationship which you have created well, just a little shy at two weeks, but have maximized supply. Violet has great access to you and you encourage demand feeding - that builds the relationship for maximizing supply.  Follow up:   Lactation appointment: Group encouraged   Baby 's Provider appointment: 2 months well child  Referrals: None    Maternal Diagnosis/Problem:  Lactation problem follow up  Infant Diagnosis/Problem:  At risk for breastfeeding difficulties    Subjective:     Fatemeh De La O is a 38 y.o. female here for lactation care. She is here today with baby and  (Lobito).    Concerns:   Weight check, Infant feeding evaluation, and Breastfeeding questions     HPI:   Pertinent  history: c/section and primary    Mother does not have a history of GDM, hypertension prior to pregnancy, GHTN, multiple gestation, thyroid disease, placenta encapsulation, and breast surgery    Mother does have advanced maternal age, insulin resistance, PCOS, and auto immune disease . Common condition(s) that may interfere in milk supply.    Breast changes in pregnancy: Minimal  History of breast surgeries: No    FEEDING HISTORY:    Previous Breastfeeding History: First baby.   Prior to consultation on 2023: Exclusive breastfeeding, 9% initial loss, at two week check baby still 4-5% under birth  weight and formula was recommended along with increasing demand. Mom did offer a 2oz bottle, baby uncomfortable after, adapted to the nipple and drank almost the whole bottle. Mom felt she was making more milk so that is the only formula she got over the weekend. Baby gained 2.9oz in 3days, not scale to scale. Mom has single pumped 3x since birth. Mom has a bleg on the left nipple she believes opened but still hurts.    Currently 8/10/2023 Exclusive breastfeeding, infant gaining well now. Mom has offered 2oz of formula over the last 10 days once appropriately. Mom enjoys breastfeeding, is working with Violet's routine and content with the dance they have created.  Fatemeh double pumps once per day some times and returns the milk to Violet in the day as needed.Not saving milk, will be returning to work short term     Both breasts: Yes    Supplement: None  until day 14, then only another 2 oz in the next 2 weeks.  Quantity: as infant desires for top off and available  How given/devices: Bottle    Nipple Shield Use: None    Breast Pumping:  Not Pumping routinely, appropriate  Type of Pump: Spectra  Flange size/type: 24mm  Wearable: no  NO pain with pumping    Maternal ROS:  Constitutional: No fever, chills. Feeling well  Breasts: No soreness of breasts and Soreness of nipples resolved  Psychiatric: Managing ok  Mental Health: No mention of feeling irritable, agitated, angry, overwhelmed, apathetic, appetite changes, exhausted nor having sleep changes outside infant feeds/demands.  Current Outpatient Medications on File Prior to Visit   Medication Sig Dispense Refill    Prenatal Vit-Fe Fumarate-FA (PRENATAL 1+1 PO) Take  by mouth.       No current facility-administered medications on file prior to visit.      Past Medical History:   Diagnosis Date    Allergy     seasonal    Asthma     Chronic bronchitis (HCC)          Objective:     Maternal Physical Lactation Exam  General: No acute distress  Breasts: Symmetrical ,  Soft, Plugged Duct - no evidence, and Mastitis  - no S/S  Nipples: bleb right nipple face  Psychiatric: Normal mood and affect. Her behavior is normal. Judgment and thought content normal.  Mental Health: Did NOT exhibit sadness, crying, feeling overwhelmed, agitation or hypervigilance.      Assessment/Plan & Lactation Counseling:     Infant Exam on Infant Chart    Infant Weight History:   7/15/23 7#13.9oz  23 7#8oz  2023: 7#10.9oz dry diaper  8/10/2023 8#6.2oz wet diaper    Infant Intake at Breast:   Left 0.5oz   Right 0.9oz    Total: 1.4oz  Milk Transfer at this feeding:   Effective breastfeeding for available milk   Pumped: not indicated  TInitiation of Feeding: Infant initiates  Position of Feeding:    Right: Cross cradle  Left: Cross cradle  Attachment Achieved: rapidly  Nipple shield: N/A   Suck Pattern at the Breast: Suck burst and normal rest   Suck Pattern on the Bottle: Not Indicated   Behavior Following Observed Feeding: content    Latch: Mom latches independently  Suckling/Feeding: attaches, audible swallows, baby roots, elicits PRIETO, and rhythmic  Sucking strength: Moderate Strong  Sucking Rhythm Coordinated   Compression: WNL    Once latched, baby fell into a mature and fully integrated SSB pattern.    Swallowing No difficulty noted  If functional feeding, it is quiet, rhythmic, coordinated, organized, effeicent safe, satisfying and pleasurable for both parent and baby?  Yes  Milk Supply Available: normal    MATERNAL PERSONALIZED BREASTFEEDING PLAN  Discussed concerns and symptoms as listed above in assessment and guidance summarized below. Shared decision making was used between myself and the family for this encounter, home care, and follow up. Topics reviewed included:   4th Trimester: The 12-week period immediately after mom has had the baby. Not everyone has heard of it, but every mother and their  baby will go through it. It is a time of great physical and emotional change as the  baby adjusts to being outside the womb, and the family adjusts to new life as parents  During the fourth trimester, one can expect fussiness and crying from the baby and very likely exhaustion for the family. [place] babies are learning to adjust to life outside the womb where it was warm and squishy!  There is a lot of misinformation about babies and their needs, and parents are often encouraged to ignore baby's signals. Bad idea. Babies are “half-baked” at birth and have much to learn with the help of physical and emotional support from caregivers. Taking care of a baby's needs is an investment that pays off with a happier, healthier child and adult.  It can take weeks or even months for your body to feel totally normal again. There is a major hormonal upheaval experienced by moms in the first few weeks after birth, because their bodies are shifting from many pregnancy hormones to a more normal hormonal make-up.  These first three months with baby earth side is a delicate time. Honor it with a mindful dose of support. Mindful Mamma's is an norberto that may help.   The nature of infants oral head/neck structure and function and its impact on latch and transfer of milk.   Discussed Mechanical forces resulting in strain patterns during intrauterine life and during birth may negatively affect the oral-motor function of the  and compromise structure and function.  Joint restriction or hypo-mobility could be a logical progression following the relative lack of mobility during the last crowded months of gestation. An exceptionally flexed or rotated fetal posture may tighten myofascial structures in the neck, restricting the hyoid bone and strap muscles that support an effective swallow. More research reveals the body as an integrated whole via its major structure-maintaining and sensory organ, the fascia.  Other musculoskeletal issues, such as neck preferences, may also affect an infant's ability to nurse. These views  have expanded and deepened over time.   Unilateral neck rotation is a normal  finding that should correct itself over the next few weeks.    However when there is a neck preference it can make latching more difficult.    Infant head can be supported in the car seat.    Bottle feeding baby's can be encouraged to look to the opposite side of the preference  Infant can be can talked to from the side encouraging baby to turn to.   Milk Supply is dependent on glandular tissue development, hormonal influences, how many times the baby removes milk and how well the breasts are emptied in a 24 hour period. This is a biological reality that we can NOT work around. If, for any reason, your baby is not latching, or you are not able to nurse, then it is important for you to remove the milk instead by pumping or hand expression.  There's no magic trick, tea, food, drink, cookie or supplement that will increase your milk supply. One  must  effectively remove milk to continue to make and maximize milk. In the early days and weeks that can be 8+ times in 24 hours. For older babies, on average 6-7 + times in 24 hours.    Feeding:   Feed your baby every 1.5-3 hours, more often if baby acts hungry.   Awaken baby for feeding if going over 3 hours in the day.   No alarms needed at night now that back to birth weight    Daily goal: 8-12 feedings per 24 hours.   Strategies to facilitate feedings  Pump after some morning feedings to distribute in the day  Supplement:   Not indicated  Pumping Guidelines:  Both breasts 0-1 time in 24 hours  Type of Pump:  Spectra  Connect with other mothers:  Facebook:   [place] [facility]: https://[email or web address]/[email or web address].[email or web address]  Well-Nourished Babies (Private group for questions and support): https://www.facebook.com/groups/[email or web address]  Breastfeeding [place] including opportunity to weight your baby and do before and after weights WEIGHT CHECKS   10am - 11am. [facility] at [facility]  [address], [address], 3rd floor conference room  Check your baby's weight, do a feeding and see how your baby is growing, visit with other mothers, plan on a walk or coffee date after group.  Please download Growth: Baby and Child for Apple or Child Growth Tracker for Androids if you would like to  document and follow your baby's growth curve.  Due to space limitations - limit strollers please (New c/section moms please use your stroller).  We would love to have dads stay, but moms won't breastfeed if there are men in the room, sorry.  The room is generally scheduled for another event following group.  Please take all diapers with you   ONLINE SUPPORT GROUPS  [employer] ([employer]) support groups are conducted using a uras-qs-eiqo support model and are not intended for those experiencing a mental health crisis. Groups are 90 minutes (1.5 hours) in length. The first ~30 minutes is providing information, education, and establishing group guidelines. The next ~60 minutes is “talk time,” in which group members share and talk with each other. Group members must be present for the group guidelines before joining in the discussion or “talk time.”     Follow up requires close monitoring in this time sensitive window of opportunity to establish milk suppl. Please call [phone] our voicemail line or the front office at [phone] to scheduled your next appointment.  Family is encouraged to e-mail or mychart us to update how the plan is working.    A total of 45 minutes, not including infant assessment time,  was spent preparing to see the patient, obtaining and reviewing separately obtained history, performing a medically appropriate examination and evaluation, counseling and educating the family, ordering medications, test or procedures, referring and communicating with other health care professionals, documenting clinical information in the electronic health record, independently interpreting weighted  feeds and infant growth results, communicating these results to the family and care coordination as detailed in the above note.       JAM Fuentes.

## 2025-08-19 ENCOUNTER — APPOINTMENT (OUTPATIENT)
Dept: ADMISSIONS | Facility: MEDICAL CENTER | Age: 40
End: 2025-08-19
Attending: OBSTETRICS & GYNECOLOGY
Payer: COMMERCIAL

## 2025-08-25 ENCOUNTER — PRE-ADMISSION TESTING (OUTPATIENT)
Dept: ADMISSIONS | Facility: MEDICAL CENTER | Age: 40
End: 2025-08-25
Attending: OBSTETRICS & GYNECOLOGY
Payer: COMMERCIAL

## 2025-08-25 RX ORDER — ACETAMINOPHEN 500 MG
1000 TABLET ORAL EVERY 6 HOURS PRN
COMMUNITY

## 2025-08-25 RX ORDER — CALCIUM CARBONATE 500 MG/1
500 TABLET, CHEWABLE ORAL PRN
COMMUNITY

## 2025-08-25 ASSESSMENT — LIFESTYLE VARIABLES
HOW OFTEN DO YOU HAVE A DRINK CONTAINING ALCOHOL: NEVER
HOW MANY STANDARD DRINKS CONTAINING ALCOHOL DO YOU HAVE ON A TYPICAL DAY: PATIENT DOES NOT DRINK
AUDIT-C TOTAL SCORE: 0
SKIP TO QUESTIONS 9-10: 1
HOW OFTEN DO YOU HAVE SIX OR MORE DRINKS ON ONE OCCASION: NEVER

## (undated) DEVICE — CATHETER IV NON-SAFETY 18 GA X 1 1/4 (50/BX 4BX/CA)

## (undated) DEVICE — PACK DELIVERY ROOM (7EA/CA)

## (undated) DEVICE — SET EXTENSION WITH 2 PORTS (48EA/CA) ***PART #2C8610 IS A SUBSTITUTE*****

## (undated) DEVICE — SUTURE 0 VICRYL PLUS CT-1 - 36 INCH (36/BX)

## (undated) DEVICE — PACK ROOM TURNOVER L&D (12/CA)

## (undated) DEVICE — DRESSING POST OP BORDER 4 X 10 (5EA/BX)

## (undated) DEVICE — WATER IRRIGATION STERILE 1000ML (12EA/CA)

## (undated) DEVICE — SUTURE 4-0 VICRYL PLUSFS-1 - 27 INCH (36/BX)

## (undated) DEVICE — GLOVE BIOGEL SZ 6.5 SURGICAL PF LTX (50PR/BX 4BX/CA)

## (undated) DEVICE — SUTURE 3-0 VICRYL PLUS CT-1 - 36 INCH (36/BX)

## (undated) DEVICE — SLEEVE, SEQUENTIAL CALF REG

## (undated) DEVICE — SOLUTION PLASMA-LYTE PH 7.4 INJ 1000ML  (14EA/CA)

## (undated) DEVICE — SUTURE 2-0 VICRYL PLUS CT-1 36 (36PK/BX)"

## (undated) DEVICE — KIT  I.V. START (100EA/CA)

## (undated) DEVICE — SWABSTICK BENZOIN SINGLE (50EA/BX)

## (undated) DEVICE — HEAD HOLDER JUNIOR/ADULT

## (undated) DEVICE — DETERGENT RENUZYME PLUS 10 OZ PACKET (50/BX)

## (undated) DEVICE — TRAY SPINAL ANESTHESIA NON-SAFETY (10/CA)

## (undated) DEVICE — SODIUM CHL IRRIGATION 0.9% 1000ML (12EA/CA)

## (undated) DEVICE — PACK VAGINAL FOR L&D (4EA/CA)

## (undated) DEVICE — TUBING CLEARLINK DUO-VENT - C-FLO (48EA/CA)

## (undated) DEVICE — ELECTRODE DUAL RETURN W/ CORD - (50/PK)

## (undated) DEVICE — TRAY SRGPRP PVP IOD WT PRP - (20/CA)

## (undated) DEVICE — PACK C-SECTION (2EA/CA)

## (undated) DEVICE — CLOSURE SKIN STRIP 1/2 X 4 IN - (STERI STRIP) (50/BX 4BX/CA)

## (undated) DEVICE — SOLUTION 10%PVP-IODINE 8OZ - (24/CA)